# Patient Record
Sex: MALE | Race: WHITE | URBAN - METROPOLITAN AREA
[De-identification: names, ages, dates, MRNs, and addresses within clinical notes are randomized per-mention and may not be internally consistent; named-entity substitution may affect disease eponyms.]

---

## 2017-01-17 ENCOUNTER — OFFICE (OUTPATIENT)
Dept: URBAN - METROPOLITAN AREA CLINIC 45 | Facility: CLINIC | Age: 72
End: 2017-01-17

## 2017-01-17 VITALS
SYSTOLIC BLOOD PRESSURE: 136 MMHG | WEIGHT: 175 LBS | HEART RATE: 68 BPM | TEMPERATURE: 97.3 F | HEIGHT: 66 IN | DIASTOLIC BLOOD PRESSURE: 72 MMHG

## 2017-01-17 DIAGNOSIS — R19.4 ALTERED BOWEL FUNCTION: ICD-10-CM

## 2017-01-17 DIAGNOSIS — I10 HYPERTENSION: ICD-10-CM

## 2017-01-17 DIAGNOSIS — R10.11 RIGHT UPPER QUADRANT PAIN: ICD-10-CM

## 2017-01-17 DIAGNOSIS — B18.2 HEPATITIS C CHRONIC: ICD-10-CM

## 2017-01-17 PROCEDURE — 99214 OFFICE O/P EST MOD 30 MIN: CPT | Performed by: INTERNAL MEDICINE

## 2017-01-17 NOTE — SERVICEHPINOTES
The patient complains of abdominal pain. Symptoms began several months ago with onset that was gradual. It is localized as right upper quadrant pain. It is described as moderate in nature. Pain quality is described as dull. It also radiates to the sub-sternal region and epigastric region. Discomfort typically lasts a few hours and has a course which is intermittent. It usually starts gradually. Symptom triggers include nothing specific and eating. Alleviating factors include nothing specific and rest. Symptoms have been non-progressive/stable since onset. Alarm features noted: none. The patient also reports abdominal bloating/distension and constipation. Symptoms have caused the patient to see primary care physician. Noteworthy prior abdominal interventions include hernia repair. EGD and COLONOSCOPY 2011, Suchov has been performed previously to evaluate the patient's symptoms. Remedies tried to date include PPI with no benefit. Denies any blood in stool or black stool. No fever or chills. Pt has hx of Hep C under care of [facility] sp Rx, told of cure. Dr Mellissa Chambers.    Pt currently taking Pantop for RUQ pain and feels better. No heartburn but has reflux wo vomiting or dysphagia. Has not lost weight. The patient complains of a change in bowel habit/pattern.    Change in bowel function started   several  months   ago.   Currently the patient is having   3   bowel movement(s) per   day  .   Compared to baseline, the patient's stools have become   semi-formed and decreased in caliber   in consistency/appearance.  They are typically   brown   in color.      Associated symptoms have included   alternating bowel pattern  .    The patient has tried   PPI therapy   so far.  This   has not   resulted in noticeable improvement.    Alarm features reported:   none  .   The patient last underwent a colonoscopy   5   years ago.   Usually had 1 bm daily. Now may have 2-3 bms followed by constipation.

## 2017-02-13 ENCOUNTER — AMBULATORY SURGICAL CENTER (OUTPATIENT)
Dept: URBAN - METROPOLITAN AREA SURGERY 37 | Facility: SURGERY | Age: 72
End: 2017-02-13

## 2017-02-13 VITALS
DIASTOLIC BLOOD PRESSURE: 80 MMHG | WEIGHT: 175 LBS | OXYGEN SATURATION: 96 % | TEMPERATURE: 98 F | HEART RATE: 76 BPM | SYSTOLIC BLOOD PRESSURE: 153 MMHG | RESPIRATION RATE: 15 BRPM | HEIGHT: 66 IN

## 2017-02-13 DIAGNOSIS — R10.11 RIGHT UPPER QUADRANT PAIN: ICD-10-CM

## 2017-02-13 DIAGNOSIS — K31.89 OTHER DISEASES OF STOMACH AND DUODENUM: ICD-10-CM

## 2017-02-13 DIAGNOSIS — Z12.11 ENCOUNTER FOR SCREENING FOR MALIGNANT NEOPLASM OF COLON: ICD-10-CM

## 2017-02-13 DIAGNOSIS — R93.3 ABNORMAL FINDINGS ON DX IMAGING OF PRT DIGESTIVE TRACT: ICD-10-CM

## 2017-02-13 DIAGNOSIS — K64.0 FIRST DEGREE HEMORRHOIDS: ICD-10-CM

## 2017-02-13 LAB — SURGICAL: PDF REPORT: (no result)

## 2017-02-13 PROCEDURE — 43239 EGD BIOPSY SINGLE/MULTIPLE: CPT | Performed by: INTERNAL MEDICINE

## 2017-02-13 PROCEDURE — 45378 DIAGNOSTIC COLONOSCOPY: CPT | Performed by: INTERNAL MEDICINE

## 2017-02-13 NOTE — SERVICEHPINOTES
The patient complains of abdominal pain. Symptoms began several months ago with onset that was gradual. It is localized as right upper quadrant pain. It is described as moderate in nature. Pain quality is described as dull. It also radiates to the sub-sternal region and epigastric region. Discomfort typically lasts a few hours and has a course which is intermittent. It usually starts gradually. Symptom triggers include nothing specific and eating. Alleviating factors include nothing specific and rest. Symptoms have been non-progressive/stable since onset. Alarm features noted: none. The patient also reports abdominal bloating/distension and constipation. Symptoms have caused the patient to see primary care physician. Noteworthy prior abdominal interventions include hernia repair. EGD and COLONOSCOPY 2011, Suchov has been performed previously to evaluate the patient's symptoms. Remedies tried to date include PPI with no benefit. Denies any blood in stool or black stool. No fever or chills. Pt has hx of Hep C under care of [facility] sp Rx, told of cure. Dr Mellissa Chambers. Pt currently taking Pantop for RUQ pain and feels better. No heartburn but has reflux wo vomiting or dysphagia. Has not lost weight. The patient complains of a change in bowel habit/pattern. Change in bowel function started several months ago. Currently the patient is having 3 bowel movement(s) per day. Compared to baseline, the patient's stools have become semi-formed and decreased in caliber in consistency/appearance. They are typically brown in color. Associated symptoms have included alternating bowel pattern. The patient has tried PPI therapy so far. This has not resulted in noticeable improvement. Alarm features reported: none. The patient last underwent a colonoscopy 5 years ago. Usually had 1 bm daily. Now may have 2-3 bms followed by constipation.

## 2017-02-23 ENCOUNTER — OFFICE (OUTPATIENT)
Dept: URBAN - METROPOLITAN AREA CLINIC 45 | Facility: CLINIC | Age: 72
End: 2017-02-23

## 2017-02-23 VITALS
WEIGHT: 175 LBS | SYSTOLIC BLOOD PRESSURE: 128 MMHG | TEMPERATURE: 97.9 F | HEART RATE: 64 BPM | DIASTOLIC BLOOD PRESSURE: 67 MMHG | HEIGHT: 66 IN

## 2017-02-23 DIAGNOSIS — K29.70 GASTRITIS, UNSPECIFIED, WITHOUT BLEEDING: ICD-10-CM

## 2017-02-23 DIAGNOSIS — B18.2 HEPATITIS C CHRONIC: ICD-10-CM

## 2017-02-23 DIAGNOSIS — I10 HYPERTENSION: ICD-10-CM

## 2017-02-23 PROCEDURE — 99213 OFFICE O/P EST LOW 20 MIN: CPT | Performed by: INTERNAL MEDICINE

## 2017-02-23 NOTE — SERVICEHPINOTES
Follow-up of GERD was discussed.   The patient has typically complained of   heartburn/RUQ pain  .      Treatment has consisted of   OTC antacids and Pantoprazole  taken at   once daily  .   This therapy has been associated with   good   relief.   The patient   has not   been having breakthru GERD symptoms.  Symptoms do   not awaken the patient from sleep  .    Has been treating residual symptoms with   OTC antacids  .   Continuing symptoms may be brought on by   nothing specific  .    Recent egd gerd/gastritis. No n/v fever or chills. Colonoscopy attempt unsuccessful due to very poor prep. Still has very irreg bms with occ constipation alt with semi formed stool. No blood in stool or black stool.

## 2017-04-20 ENCOUNTER — OFFICE (OUTPATIENT)
Dept: URBAN - METROPOLITAN AREA CLINIC 45 | Facility: CLINIC | Age: 72
End: 2017-04-20

## 2017-04-20 VITALS
HEIGHT: 66 IN | HEART RATE: 67 BPM | WEIGHT: 173 LBS | TEMPERATURE: 98 F | SYSTOLIC BLOOD PRESSURE: 119 MMHG | DIASTOLIC BLOOD PRESSURE: 76 MMHG

## 2017-04-20 DIAGNOSIS — K59.00 CONSTIPATION: ICD-10-CM

## 2017-04-20 DIAGNOSIS — Z12.11 SCREENING FOR COLON CANCER: ICD-10-CM

## 2017-04-20 DIAGNOSIS — I10 HYPERTENSION: ICD-10-CM

## 2017-04-20 DIAGNOSIS — R10.11 RIGHT UPPER QUADRANT PAIN: ICD-10-CM

## 2017-04-20 DIAGNOSIS — K21.9 GERD: ICD-10-CM

## 2017-04-20 PROCEDURE — 99213 OFFICE O/P EST LOW 20 MIN: CPT | Performed by: INTERNAL MEDICINE

## 2017-04-20 NOTE — SERVICEHPINOTES
Follow-up of GERD was discussed.   The patient has typically complained of   heartburn and regurgitation/RUQ pain  .      Treatment has consisted of   Pantoprazole  taken at   once daily  .   This therapy has been associated with   partial   relief.   The patient   has   been having breakthru GERD symptoms.  Symptoms do   not awaken the patient from sleep  .    Has been treating residual symptoms with   OTC antacids  .   Continuing symptoms may be brought on by   nothing specific  .    No dysphagia or wt loss. Recent egd with mild gerd/gastritis. Continues to have intermittent dull RUQ pain relieved with bm. Bms are irreg with tendency towards constipation. Pt's last colonoscopy attempt was unsuccessful due to poor prep. Denies any blood in stool or black stool.

## 2017-05-01 ENCOUNTER — AMBULATORY SURGICAL CENTER (OUTPATIENT)
Dept: URBAN - METROPOLITAN AREA SURGERY 37 | Facility: SURGERY | Age: 72
End: 2017-05-01

## 2017-05-01 VITALS
DIASTOLIC BLOOD PRESSURE: 96 MMHG | RESPIRATION RATE: 20 BRPM | SYSTOLIC BLOOD PRESSURE: 154 MMHG | TEMPERATURE: 98 F | HEART RATE: 71 BPM | HEIGHT: 66 IN | OXYGEN SATURATION: 96 % | WEIGHT: 175 LBS

## 2017-05-01 DIAGNOSIS — K59.00 CONSTIPATION, UNSPECIFIED: ICD-10-CM

## 2017-05-01 DIAGNOSIS — K64.1 SECOND DEGREE HEMORRHOIDS: ICD-10-CM

## 2017-05-01 DIAGNOSIS — Z12.11 ENCOUNTER FOR SCREENING FOR MALIGNANT NEOPLASM OF COLON: ICD-10-CM

## 2017-05-01 DIAGNOSIS — D12.2 BENIGN NEOPLASM OF ASCENDING COLON: ICD-10-CM

## 2017-05-01 LAB — SURGICAL: PDF REPORT: (no result)

## 2017-05-01 PROCEDURE — 45380 COLONOSCOPY AND BIOPSY: CPT | Performed by: INTERNAL MEDICINE

## 2017-05-09 ENCOUNTER — OFFICE (OUTPATIENT)
Dept: URBAN - METROPOLITAN AREA CLINIC 45 | Facility: CLINIC | Age: 72
End: 2017-05-09

## 2017-05-09 VITALS
TEMPERATURE: 98 F | HEART RATE: 74 BPM | SYSTOLIC BLOOD PRESSURE: 116 MMHG | WEIGHT: 175 LBS | HEIGHT: 66 IN | DIASTOLIC BLOOD PRESSURE: 67 MMHG

## 2017-05-09 DIAGNOSIS — I10 HYPERTENSION: ICD-10-CM

## 2017-05-09 DIAGNOSIS — R10.11 RIGHT UPPER QUADRANT PAIN: ICD-10-CM

## 2017-05-09 PROCEDURE — 99213 OFFICE O/P EST LOW 20 MIN: CPT | Performed by: INTERNAL MEDICINE

## 2017-05-09 NOTE — SERVICEHPINOTES
Recent colonoscopy   pos for sessile serrated adenoma.  Regarding   right upper quadrant   abdominal pain, the most likely etiology considered thus far has been   functional abdominal pain  .   Since last visit the patient states the pain has   persisted  .    Medication classes tried so far include   PPI therapy  .      It is currently   mild   in severity.     It is   dull   in quality.    It also radiates to the   none  .     Abdominal pain triggers include   nothing specific and mental/emotional stress  .    Symptoms are alleviated by   nothing specific and defecation  .    The patient also reports   constipation  .   Ongoing alarm symptoms:   none  .    Abdominal pain has been severe enough to cause the patient to   see primary care physician  .    The workup has thus far included   complete abdominal ultrasound, EGD and colonoscopy  .

## 2017-05-10 ENCOUNTER — HOSPITAL ENCOUNTER (OUTPATIENT)
Dept: HOSPITAL 54 - LAB | Age: 72
Discharge: HOME | End: 2017-05-10
Payer: MEDICARE

## 2017-05-10 DIAGNOSIS — R10.11: ICD-10-CM

## 2017-05-10 DIAGNOSIS — I10: Primary | ICD-10-CM

## 2017-05-10 LAB
BUN SERPL-MCNC: 15 MG/DL (ref 7–18)
CREAT SERPL-MCNC: 1.1 MG/DL (ref 0.6–1.3)

## 2017-05-11 ENCOUNTER — HOSPITAL ENCOUNTER (OUTPATIENT)
Dept: HOSPITAL 54 - CT | Age: 72
Discharge: HOME | End: 2017-05-11
Payer: MEDICARE

## 2017-05-11 DIAGNOSIS — I10: Primary | ICD-10-CM

## 2017-05-11 DIAGNOSIS — N28.1: ICD-10-CM

## 2017-05-11 DIAGNOSIS — I70.0: ICD-10-CM

## 2017-05-11 DIAGNOSIS — I31.1: ICD-10-CM

## 2017-05-11 DIAGNOSIS — M47.819: ICD-10-CM

## 2017-05-11 PROCEDURE — 74160 CT ABDOMEN W/CONTRAST: CPT

## 2018-02-07 ENCOUNTER — HOSPITAL ENCOUNTER (INPATIENT)
Dept: HOSPITAL 54 - ER | Age: 73
LOS: 2 days | Discharge: ANOTHER HOSPITAL | DRG: 242 | End: 2018-02-09
Payer: MEDICARE

## 2018-02-07 VITALS — SYSTOLIC BLOOD PRESSURE: 96 MMHG | DIASTOLIC BLOOD PRESSURE: 54 MMHG

## 2018-02-07 VITALS — DIASTOLIC BLOOD PRESSURE: 54 MMHG | SYSTOLIC BLOOD PRESSURE: 94 MMHG

## 2018-02-07 VITALS — SYSTOLIC BLOOD PRESSURE: 122 MMHG | DIASTOLIC BLOOD PRESSURE: 74 MMHG

## 2018-02-07 VITALS — SYSTOLIC BLOOD PRESSURE: 97 MMHG | DIASTOLIC BLOOD PRESSURE: 57 MMHG

## 2018-02-07 VITALS — SYSTOLIC BLOOD PRESSURE: 145 MMHG | DIASTOLIC BLOOD PRESSURE: 68 MMHG

## 2018-02-07 VITALS — DIASTOLIC BLOOD PRESSURE: 72 MMHG | SYSTOLIC BLOOD PRESSURE: 122 MMHG

## 2018-02-07 VITALS — DIASTOLIC BLOOD PRESSURE: 56 MMHG | SYSTOLIC BLOOD PRESSURE: 97 MMHG

## 2018-02-07 VITALS — DIASTOLIC BLOOD PRESSURE: 66 MMHG | SYSTOLIC BLOOD PRESSURE: 116 MMHG

## 2018-02-07 VITALS — SYSTOLIC BLOOD PRESSURE: 96 MMHG | DIASTOLIC BLOOD PRESSURE: 55 MMHG

## 2018-02-07 VITALS — DIASTOLIC BLOOD PRESSURE: 62 MMHG | SYSTOLIC BLOOD PRESSURE: 122 MMHG

## 2018-02-07 VITALS — SYSTOLIC BLOOD PRESSURE: 94 MMHG | DIASTOLIC BLOOD PRESSURE: 57 MMHG

## 2018-02-07 VITALS — DIASTOLIC BLOOD PRESSURE: 60 MMHG | SYSTOLIC BLOOD PRESSURE: 102 MMHG

## 2018-02-07 VITALS — SYSTOLIC BLOOD PRESSURE: 93 MMHG | DIASTOLIC BLOOD PRESSURE: 53 MMHG

## 2018-02-07 VITALS — HEIGHT: 72 IN | WEIGHT: 186 LBS | BODY MASS INDEX: 25.19 KG/M2

## 2018-02-07 VITALS — DIASTOLIC BLOOD PRESSURE: 74 MMHG | SYSTOLIC BLOOD PRESSURE: 122 MMHG

## 2018-02-07 VITALS — SYSTOLIC BLOOD PRESSURE: 100 MMHG | DIASTOLIC BLOOD PRESSURE: 45 MMHG

## 2018-02-07 VITALS — SYSTOLIC BLOOD PRESSURE: 131 MMHG | DIASTOLIC BLOOD PRESSURE: 77 MMHG

## 2018-02-07 DIAGNOSIS — I25.10: ICD-10-CM

## 2018-02-07 DIAGNOSIS — Z86.19: ICD-10-CM
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
## 2018-02-07 DIAGNOSIS — I50.9: ICD-10-CM

## 2018-02-07 DIAGNOSIS — I49.5: Primary | ICD-10-CM

## 2018-02-07 DIAGNOSIS — E11.9: ICD-10-CM

## 2018-02-07 DIAGNOSIS — R56.9: ICD-10-CM

## 2018-02-07 DIAGNOSIS — N17.0: ICD-10-CM
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
## 2018-02-07 DIAGNOSIS — I11.0: ICD-10-CM

## 2018-02-07 DIAGNOSIS — I21.4: ICD-10-CM

## 2018-02-07 DIAGNOSIS — R55: ICD-10-CM

## 2018-02-07 DIAGNOSIS — D72.829: ICD-10-CM

## 2018-02-07 DIAGNOSIS — E66.9: ICD-10-CM

## 2018-02-07 DIAGNOSIS — I35.0: ICD-10-CM

## 2018-02-07 DIAGNOSIS — S42.291A: ICD-10-CM

## 2018-02-07 DIAGNOSIS — I46.9: ICD-10-CM

## 2018-02-07 LAB
ALBUMIN SERPL BCP-MCNC: 3.6 G/DL (ref 3.4–5)
ALBUMIN SERPL BCP-MCNC: 3.9 G/DL (ref 3.4–5)
ALP SERPL-CCNC: 81 U/L (ref 46–116)
ALP SERPL-CCNC: 90 U/L (ref 46–116)
ALT SERPL W P-5'-P-CCNC: 31 U/L (ref 12–78)
ALT SERPL W P-5'-P-CCNC: 34 U/L (ref 12–78)
AMMONIA PLAS-SCNC: 25 UMOL/L (ref 11–32)
APAP SERPL-MCNC: 0 UG/ML (ref 10–30)
APPEARANCE UR: CLEAR
APTT PPP: 24 SEC (ref 23–34)
AST SERPL W P-5'-P-CCNC: 35 U/L (ref 15–37)
AST SERPL W P-5'-P-CCNC: 59 U/L (ref 15–37)
BASOPHILS # BLD AUTO: 0 /CMM (ref 0–0.2)
BASOPHILS # BLD AUTO: 0 /CMM (ref 0–0.2)
BASOPHILS NFR BLD AUTO: 0.2 % (ref 0–2)
BASOPHILS NFR BLD AUTO: 0.2 % (ref 0–2)
BILIRUB DIRECT SERPL-MCNC: 0.1 MG/DL (ref 0–0.2)
BILIRUB SERPL-MCNC: 0.4 MG/DL (ref 0.2–1)
BILIRUB SERPL-MCNC: 0.9 MG/DL (ref 0.2–1)
BILIRUB UR QL STRIP: NEGATIVE
BUN SERPL-MCNC: 21 MG/DL (ref 7–18)
BUN SERPL-MCNC: 24 MG/DL (ref 7–18)
CALCIUM SERPL-MCNC: 8.4 MG/DL (ref 8.5–10.1)
CALCIUM SERPL-MCNC: 9.2 MG/DL (ref 8.5–10.1)
CHLORIDE SERPL-SCNC: 100 MMOL/L (ref 98–107)
CHLORIDE SERPL-SCNC: 102 MMOL/L (ref 98–107)
CHOLEST SERPL-MCNC: 114 MG/DL (ref ?–200)
CO2 SERPL-SCNC: 22 MMOL/L (ref 21–32)
CO2 SERPL-SCNC: 27 MMOL/L (ref 21–32)
COLOR UR: YELLOW
CREAT SERPL-MCNC: 1 MG/DL (ref 0.6–1.3)
CREAT SERPL-MCNC: 1.2 MG/DL (ref 0.6–1.3)
EOSINOPHIL # BLD AUTO: 0 /CMM (ref 0–0.7)
EOSINOPHIL # BLD AUTO: 0.1 /CMM (ref 0–0.7)
EOSINOPHIL NFR BLD AUTO: 0 % (ref 0–6)
EOSINOPHIL NFR BLD AUTO: 0.4 % (ref 0–6)
ETHANOL SERPL-MCNC: < 3 MG/DL (ref 0–0)
GLUCOSE SERPL-MCNC: 134 MG/DL (ref 74–106)
GLUCOSE SERPL-MCNC: 198 MG/DL (ref 74–106)
GLUCOSE UR STRIP-MCNC: (no result) MG/DL
HCT VFR BLD AUTO: 38 % (ref 39–51)
HCT VFR BLD AUTO: 41 % (ref 39–51)
HDLC SERPL-MCNC: 57 MG/DL (ref 40–60)
HGB BLD-MCNC: 13.1 G/DL (ref 13.5–17.5)
HGB BLD-MCNC: 14.1 G/DL (ref 13.5–17.5)
HGB UR QL STRIP: (no result) ERY/UL
INR PPP: 1.08 (ref 0.87–1.13)
KETONES UR STRIP-MCNC: NEGATIVE MG/DL
LDLC SERPL DIRECT ASSAY-MCNC: 56 MG/DL (ref 0–99)
LEUKOCYTE ESTERASE UR QL STRIP: NEGATIVE
LYMPHOCYTES NFR BLD AUTO: 0.3 /CMM (ref 0.8–4.8)
LYMPHOCYTES NFR BLD AUTO: 1.5 /CMM (ref 0.8–4.8)
LYMPHOCYTES NFR BLD AUTO: 2.5 % (ref 20–44)
LYMPHOCYTES NFR BLD AUTO: 9.2 % (ref 20–44)
MAGNESIUM SERPL-MCNC: 2.2 MG/DL (ref 1.8–2.4)
MCH RBC QN AUTO: 31 PG (ref 26–33)
MCH RBC QN AUTO: 31 PG (ref 26–33)
MCHC RBC AUTO-ENTMCNC: 34 G/DL (ref 31–36)
MCHC RBC AUTO-ENTMCNC: 35 G/DL (ref 31–36)
MCV RBC AUTO: 88 FL (ref 80–96)
MCV RBC AUTO: 89 FL (ref 80–96)
MONOCYTES NFR BLD AUTO: 0.6 /CMM (ref 0.1–1.3)
MONOCYTES NFR BLD AUTO: 0.8 /CMM (ref 0.1–1.3)
MONOCYTES NFR BLD AUTO: 4.5 % (ref 2–12)
MONOCYTES NFR BLD AUTO: 5.1 % (ref 2–12)
NEUTROPHILS # BLD AUTO: 13.2 /CMM (ref 1.8–8.9)
NEUTROPHILS # BLD AUTO: 13.8 /CMM (ref 1.8–8.9)
NEUTROPHILS NFR BLD AUTO: 85.1 % (ref 43–81)
NEUTROPHILS NFR BLD AUTO: 92.8 % (ref 43–81)
NITRITE UR QL STRIP: NEGATIVE
NT-PROBNP SERPL-MCNC: 141 PG/ML (ref 0–125)
PH UR STRIP: 6 [PH] (ref 5–8)
PHOSPHATE SERPL-MCNC: 2.2 MG/DL (ref 2.5–4.9)
PLATELET # BLD AUTO: 169 /CMM (ref 150–450)
PLATELET # BLD AUTO: 212 /CMM (ref 150–450)
POTASSIUM SERPL-SCNC: 3.7 MMOL/L (ref 3.5–5.1)
POTASSIUM SERPL-SCNC: 3.7 MMOL/L (ref 3.5–5.1)
PROT SERPL-MCNC: 6.7 G/DL (ref 6.4–8.2)
PROT SERPL-MCNC: 7.5 G/DL (ref 6.4–8.2)
PROT UR QL STRIP: NEGATIVE MG/DL
RBC # BLD AUTO: 4.27 MIL/UL (ref 4.5–6)
RBC # BLD AUTO: 4.61 MIL/UL (ref 4.5–6)
RBC #/AREA URNS HPF: (no result) /HPF (ref 0–2)
RDW COEFFICIENT OF VARIATION: 13.4 (ref 11.5–15)
RDW COEFFICIENT OF VARIATION: 13.7 (ref 11.5–15)
SALICYLATES SERPL-MCNC: 1 MG/DL (ref 2.8–20)
SODIUM SERPL-SCNC: 136 MMOL/L (ref 136–145)
SODIUM SERPL-SCNC: 136 MMOL/L (ref 136–145)
TRIGL SERPL-MCNC: 24 MG/DL (ref 30–150)
TROPONIN I SERPL-MCNC: 3.65 NG/ML (ref 0–0.06)
TROPONIN I SERPL-MCNC: < 0.017 NG/ML (ref 0–0.06)
TSH SERPL DL<=0.005 MIU/L-ACNC: 1.23 UIU/ML (ref 0.36–3.74)
UROBILINOGEN UR STRIP-MCNC: 0.2 EU/DL
WBC #/AREA URNS HPF: (no result) /HPF (ref 0–3)
WBC NRBC COR # BLD AUTO: 14.3 K/UL (ref 4.3–11)
WBC NRBC COR # BLD AUTO: 16.3 K/UL (ref 4.3–11)

## 2018-02-07 PROCEDURE — 02H63JZ INSERTION OF PACEMAKER LEAD INTO RIGHT ATRIUM, PERCUTANEOUS APPROACH: ICD-10-PCS

## 2018-02-07 PROCEDURE — A4606 OXYGEN PROBE USED W OXIMETER: HCPCS

## 2018-02-07 PROCEDURE — 02HK3JZ INSERTION OF PACEMAKER LEAD INTO RIGHT VENTRICLE, PERCUTANEOUS APPROACH: ICD-10-PCS

## 2018-02-07 PROCEDURE — 0JH606Z INSERTION OF PACEMAKER, DUAL CHAMBER INTO CHEST SUBCUTANEOUS TISSUE AND FASCIA, OPEN APPROACH: ICD-10-PCS

## 2018-02-07 PROCEDURE — G0480 DRUG TEST DEF 1-7 CLASSES: HCPCS

## 2018-02-07 PROCEDURE — A4216 STERILE WATER/SALINE, 10 ML: HCPCS

## 2018-02-07 PROCEDURE — Z7610: HCPCS

## 2018-02-07 PROCEDURE — A9579 GAD-BASE MR CONTRAST NOS,1ML: HCPCS

## 2018-02-07 RX ADMIN — SODIUM CHLORIDE, SODIUM LACTATE, POTASSIUM CHLORIDE, AND CALCIUM CHLORIDE PRN MLS/HR: .6; .31; .03; .02 INJECTION, SOLUTION INTRAVENOUS at 11:14

## 2018-02-07 RX ADMIN — FAMOTIDINE SCH MG: 10 INJECTION INTRAVENOUS at 22:28

## 2018-02-07 RX ADMIN — LORAZEPAM ONE MG: 2 INJECTION INTRAMUSCULAR; INTRAVENOUS at 02:35

## 2018-02-07 RX ADMIN — Medication PRN MG: at 19:33

## 2018-02-07 RX ADMIN — LORAZEPAM ONE MG: 2 INJECTION INTRAMUSCULAR; INTRAVENOUS at 01:00

## 2018-02-07 RX ADMIN — SODIUM CHLORIDE SCH MLS/HR: 9 INJECTION, SOLUTION INTRAVENOUS at 12:19

## 2018-02-07 RX ADMIN — FAMOTIDINE SCH MG: 10 INJECTION INTRAVENOUS at 11:13

## 2018-02-07 RX ADMIN — SODIUM CHLORIDE SCH MLS/HR: 9 INJECTION, SOLUTION INTRAVENOUS at 04:02

## 2018-02-07 RX ADMIN — SODIUM CHLORIDE SCH MLS/HR: 9 INJECTION, SOLUTION INTRAVENOUS at 04:01

## 2018-02-07 RX ADMIN — DEXTROSE MONOHYDRATE SCH MLS/HR: 50 INJECTION, SOLUTION INTRAVENOUS at 18:16

## 2018-02-07 RX ADMIN — ASPIRIN 81 MG SCH MG: 81 TABLET ORAL at 15:04

## 2018-02-07 RX ADMIN — ENOXAPARIN SODIUM SCH EA: 100 INJECTION SUBCUTANEOUS at 17:00

## 2018-02-07 RX ADMIN — ENOXAPARIN SODIUM SCH MG: 80 INJECTION SUBCUTANEOUS at 22:30

## 2018-02-07 RX ADMIN — SODIUM CHLORIDE SCH MLS/HR: 9 INJECTION, SOLUTION INTRAVENOUS at 04:08

## 2018-02-07 NOTE — NUR
RN INITIAL NOTES



RECEIVED PATIENT AS TRANSFER FROM ICU VIA ACLS PROTOCOL, PATIENT TOLERATED TRANSFER WELL. 
PATIENT IS LETHARGIC, BUT AROUSABLE TO NAME, ALERT AND ORIENTED X3. BREATHING EVEN AND 
NONLABORED WHILE ON O2 VIA NC @ 2LPM, TOLERATING WELL, FREE FROM ANY S/S OF RESPIRATORY 
DISTRESS. PLACED ON TELEMETRY MONITORING, REVEALING SINUS RHYTHM WITH BBB, HR = 72 AT THIS 
TIME, NO PACER SPIKES NOTED AT THIS TIME. RIGHT ARM SLING IN PLACE. IV SITES PATENT AND 
INTACT, FLUSHED WITH NS, FREE FROM ANY S/S OF INFILTRATION OR PHLEBITIS, IVF INFUSING AS 
ORDERED. DVT PUMPS IN PLACE. CALL LIGHT LEFT WITHIN EASY REACH, BED IN LOWEST AND LOCKED 
POSITION. WILL CONTINUE TO CLOSELY MONITOR

## 2018-02-07 NOTE — NUR
RN NOTES



PT COMPLAINED OF SEVERE PAIN  BETWEEN 8-10 SCALE. PT IS AOX3 STILL SLEEPY BUT VERBALIZED 
FEELINGS OF PAIN, FAMILY AT BEDSIDE.  PRN MORPHINE IVP ADMINISTERED AS ORDERED.  AND WILL 
FOLLOW UP  FOR EFFECTIVENESS AFTER 30 -1 H. FAMILY AT BEDSIDE (DAUGHTER AND WIFE) MADE 
AWARE.

## 2018-02-07 NOTE — NUR
RN NOTE

OBTAINED CONSENT FOR PACEMAKER PLACEMENT FROM WIFE AWA FARIA AFTER SHE SPOKE WITH DR MCCLELLAN. 
DAUGHTER AND SON IN LAW AT BEDSIDE. UPDATE REGARDING THE PT GIVEN.

## 2018-02-07 NOTE — NUR
ICU/RN

RECEIVED DIRECT FROM ER VIA [facility] TO  W/ ADMITTING DX OF SEIZURE.MONITOR SHOWS NSR 
W/OUT ECTOPICS.PT TRASHING IN BED, EXTREMELY AGITATED.W/ PROJECTILE VOMITING LARGE AMT OF 
UNDIGESTED FOOD,GIBBERISH,UNCOOPERATIVE.BATHED AND LINEN CHANGED,QUIETED DOWN AFTERWARDS.

## 2018-02-07 NOTE — NUR
RN NOTE

 PER DR FALCON DOWNGRADE PT TO TELE, PT NEEDS TO BE NPO EXCEPT MEDS POST MIDNIGHT FOR [facility] CARDIAC CATH LAB,  TIME AT 0900 ON 02/08/18, HOLD LOVENOX IN THE 
MORNING, GIVE REPORT TO CARDIAC CATH AT [phone]. WILL ENDORSE TO NEXT SHIFT

## 2018-02-07 NOTE — NUR
ICU/RN 

WIFE HERE TO SEE PT.INTERVIEWED WIFE RE:PT'S CONDITION AND QUESTIONNAIRE.CONDITION REPORT 
GIVEN.PT SLEEPING AT THIS TIME. MOVES ALL EXTREMITIES.

## 2018-02-07 NOTE — NUR
74 Y/O MALE PLACED IN BED 6 C/O LEFT SHOULDER PAIN. OXYGEN SAT LOW - 88%. 
PLACED ON 3L OXYGEN. PT TAKENTO CT.

## 2018-02-07 NOTE — NUR
RN NOTE

PT ABLE TO TOLERATE LIQUIDS AND MEDS. SWALLOWED NORMALLY EARLIER, PER DR IT IS OKAY TO ORDER 
CARDIAC DIET.

## 2018-02-07 NOTE — NUR
RN NOTES



FAMILY LEFT AND LEAVE THEIR CELL NUMBER, AWARE ABOUT THE PLAN OF CARE FOR TOMORROW TO [facility] FOR CARDIAC CATH.

## 2018-02-07 NOTE — NUR
PT GIVEN ATIVAN POST SZ. THE COMBINATION OF POST-ICTAL AND ATIVAN HAS MADE THE 
PT VERY CONFUSED. PT IS VERY RESTLES, MOVING HIS LEGS FROM SIDE TO SIDE AND 
MAKING ANIMAL SOUNDS. PT HAS BEEN ASSIGNED A ROOM - 256. REPORT IS BEING CALLED 
AND PT IS BEING PREPED FOR TRANSFER TO ICU.

## 2018-02-07 NOTE — NUR
RN NOTE

SPOKE WITH DR FALCON REGARDING TROPONIN 7.238, HE ORDERED ASPIRIN DAILY 81MG AND LOVENOX 
1MG/KG BID. WILL ORDER AND VERIFY WITH PHARMACY.

## 2018-02-07 NOTE — NUR
RN NOTE

PT SLEEPY, BUT WAKES UP, PT AWARE WHERE HE IS. NO CO PAIN. IV SITE IN R AC, VS STABLE. CALL 
LIGHT WITHIN REACH. SAFETY MEASURES AND SEIZURE PRECAUTIONS OBSERVED. WILL MONITOR

## 2018-02-07 NOTE — NUR
RN NOTE

PT CAME BACK FROM OR L CHEST WALL INCISION REDNESS, NO SWELLING, NO BLEEDING, VS STABLE, 
PAIN IN LEFT ARM  UPON MOVING, AS WELL AS ON RIGHT ARM DUE TO FRACTURE. CALL LIGHT WITHIN 
REACH, WILL MONITOR PT CLOSELY.

## 2018-02-08 VITALS — DIASTOLIC BLOOD PRESSURE: 56 MMHG | SYSTOLIC BLOOD PRESSURE: 139 MMHG

## 2018-02-08 VITALS — SYSTOLIC BLOOD PRESSURE: 116 MMHG | DIASTOLIC BLOOD PRESSURE: 71 MMHG

## 2018-02-08 VITALS — SYSTOLIC BLOOD PRESSURE: 129 MMHG | DIASTOLIC BLOOD PRESSURE: 62 MMHG

## 2018-02-08 VITALS — DIASTOLIC BLOOD PRESSURE: 61 MMHG | SYSTOLIC BLOOD PRESSURE: 143 MMHG

## 2018-02-08 VITALS — SYSTOLIC BLOOD PRESSURE: 129 MMHG | DIASTOLIC BLOOD PRESSURE: 71 MMHG

## 2018-02-08 VITALS — DIASTOLIC BLOOD PRESSURE: 66 MMHG | SYSTOLIC BLOOD PRESSURE: 135 MMHG

## 2018-02-08 LAB
ALBUMIN SERPL BCP-MCNC: 3.1 G/DL (ref 3.4–5)
ALP SERPL-CCNC: 68 U/L (ref 46–116)
ALT SERPL W P-5'-P-CCNC: 35 U/L (ref 12–78)
AST SERPL W P-5'-P-CCNC: 66 U/L (ref 15–37)
BASOPHILS # BLD AUTO: 0 /CMM (ref 0–0.2)
BASOPHILS NFR BLD AUTO: 0.4 % (ref 0–2)
BILIRUB SERPL-MCNC: 0.7 MG/DL (ref 0.2–1)
BUN SERPL-MCNC: 16 MG/DL (ref 7–18)
CALCIUM SERPL-MCNC: 8.5 MG/DL (ref 8.5–10.1)
CHLORIDE SERPL-SCNC: 105 MMOL/L (ref 98–107)
CO2 SERPL-SCNC: 22 MMOL/L (ref 21–32)
CREAT SERPL-MCNC: 0.9 MG/DL (ref 0.6–1.3)
EOSINOPHIL # BLD AUTO: 0 /CMM (ref 0–0.7)
EOSINOPHIL NFR BLD AUTO: 0.5 % (ref 0–6)
GLUCOSE SERPL-MCNC: 130 MG/DL (ref 74–106)
HCT VFR BLD AUTO: 35 % (ref 39–51)
HGB BLD-MCNC: 12.4 G/DL (ref 13.5–17.5)
LYMPHOCYTES NFR BLD AUTO: 0.5 /CMM (ref 0.8–4.8)
LYMPHOCYTES NFR BLD AUTO: 5.9 % (ref 20–44)
MAGNESIUM SERPL-MCNC: 2.3 MG/DL (ref 1.8–2.4)
MCH RBC QN AUTO: 31 PG (ref 26–33)
MCHC RBC AUTO-ENTMCNC: 35 G/DL (ref 31–36)
MCV RBC AUTO: 87 FL (ref 80–96)
MONOCYTES NFR BLD AUTO: 0.5 /CMM (ref 0.1–1.3)
MONOCYTES NFR BLD AUTO: 7 % (ref 2–12)
NEUTROPHILS # BLD AUTO: 6.8 /CMM (ref 1.8–8.9)
NEUTROPHILS NFR BLD AUTO: 86.2 % (ref 43–81)
PLATELET # BLD AUTO: 137 /CMM (ref 150–450)
POTASSIUM SERPL-SCNC: 3.9 MMOL/L (ref 3.5–5.1)
PROT SERPL-MCNC: 6.2 G/DL (ref 6.4–8.2)
RBC # BLD AUTO: 4.02 MIL/UL (ref 4.5–6)
RDW COEFFICIENT OF VARIATION: 12.4 (ref 11.5–15)
SODIUM SERPL-SCNC: 137 MMOL/L (ref 136–145)
WBC NRBC COR # BLD AUTO: 7.8 K/UL (ref 4.3–11)

## 2018-02-08 RX ADMIN — FAMOTIDINE SCH MG: 10 INJECTION INTRAVENOUS at 21:59

## 2018-02-08 RX ADMIN — SODIUM CHLORIDE SCH MLS/HR: 9 INJECTION, SOLUTION INTRAVENOUS at 01:34

## 2018-02-08 RX ADMIN — ENOXAPARIN SODIUM SCH EA: 100 INJECTION SUBCUTANEOUS at 09:00

## 2018-02-08 RX ADMIN — FAMOTIDINE SCH MG: 10 INJECTION INTRAVENOUS at 09:35

## 2018-02-08 RX ADMIN — ASPIRIN 81 MG SCH MG: 81 TABLET ORAL at 09:35

## 2018-02-08 RX ADMIN — DEXTROSE MONOHYDRATE SCH MLS/HR: 50 INJECTION, SOLUTION INTRAVENOUS at 02:23

## 2018-02-08 RX ADMIN — ENOXAPARIN SODIUM SCH EA: 100 INJECTION SUBCUTANEOUS at 15:09

## 2018-02-08 RX ADMIN — Medication PRN MG: at 22:01

## 2018-02-08 RX ADMIN — ENOXAPARIN SODIUM SCH MG: 80 INJECTION SUBCUTANEOUS at 09:00

## 2018-02-08 RX ADMIN — SODIUM CHLORIDE, SODIUM LACTATE, POTASSIUM CHLORIDE, AND CALCIUM CHLORIDE PRN MLS/HR: .6; .31; .03; .02 INJECTION, SOLUTION INTRAVENOUS at 10:30

## 2018-02-08 NOTE — NUR
TELE RN NOTES



CALLED [facility], THEY CANCEL THE CARDIAC CATH DUE TO NO BED AVAILABLE PER AKIL 
OF [facility].

## 2018-02-08 NOTE — NUR
TELE RN NOTES



RECEIVED PT ON BED SLEEPING, ALERT ORIENTED X3. ON NC 2L, SATURATING WELL NO SIGN OF 
RESPIRATORY DISTRESS. NO TELE MONITOR SR WITH BBB HR 70S. IV ACCESS LAC AND RAC. IV FLUID 
#20G D51/2NS WITH KCI 20MEQ @60CC/HR RUNNING WELL.  HEAD OF BED ELEVATED. SIDE RAILS UP. 
CALL LIGHT WITHIN REACH. WILL CONTINUE TO MONITOR PT CLOSELY.

## 2018-02-08 NOTE — NUR
RN NOTES



PATIENT WITH C/O RIGHT SHOULDER PAIN. PATIENT REPOSITIONED SLIGHTLY FOR COMFORT, WITH NOTED 
IMPROVEMENT IN PAIN. OFFERED PAIN MEDICATION, BUT PATIENT REFUSES. WILL CONTINUE TO CLOSELY 
MONITOR

## 2018-02-08 NOTE — NUR
RN OPENING NOTES

PT RESTING IN BED. FAMILY AT BEDSIDE. PT HAS RIGHT SHOULDER FX. PT IS TELE MONITORED AT SR 
WITH BBB RATE IN THE 80'S. S/P PACEMAKER PLACEMENT (2/7/18). PT WILL BE TRANSFERRED TO 
[facility] FOR CARDIAC CATH  WILL BE AT 0830. CONFIRMED WITH TRANSPORT. PT WILL BE 
NPO AFTER MIDNIGHT. PT HAS A R AC #16 INTACT AND PATENT AND A L AC #20 RUNNING D5 1/2 NS 
WITH KCL 20MEQ @60ML/HR. PT TOLERATING FLUIDS WELL. SAFETY PRECAUTIONS IN PLACE. WILL 
CONTINUE TO MONITOR.

## 2018-02-08 NOTE — NUR
RN CLOSING NOTES



PATIENT RESTING COMFORTABLY IN BED, DENIES PAIN. PATIENT NPO EXCEPT MEDS SINCE [place]. PATIENT 
ENDORSED TO THE AM SHIFT NURSE FOR MICKY, AWARE TO HOLD LOVENOX DOSE, SCHEDULED FOR CARDIAC 
CATH AT [facility],  AT 0900 PER CM.

## 2018-02-08 NOTE — NUR
TELE RN NOTES



NO ACUTE CHANGES NOTED DURING THE SHIFT. PROVIDED COMFORT AND SAFETY. DUE MEDS GIVEN. WILL 
ENDORSED TO THE PM NURSE FOR MICKY.

## 2018-02-08 NOTE — NUR
RN NOTES

PT AND FAMILY REFUSED XR OF SPINE. FAMILY STATED THAT THE PT WAS IN TOO MUCH PAIN TO GO 
THROUGH XRAY. GAVE FAMILY OPTION OF MEDICATING PT BEFORE XRAY. FAMILY CONTINUED TO REFUSE.

## 2018-02-08 NOTE — NUR
RN NOTES

PT REQUESTED PAIN PRN PAIN MEDICATION FOR RIGHT SHOULDER PAIN. WILL ADMINISTER PRN MORPHINE 
2MG. WILL CONTINUE TO MONITOR.

## 2018-02-09 ENCOUNTER — HOSPITAL ENCOUNTER (INPATIENT)
Dept: HOSPITAL 91 - CCL | Age: 73
LOS: 7 days | Discharge: HOME HEALTH | DRG: 247 | End: 2018-02-16
Payer: MEDICARE

## 2018-02-09 ENCOUNTER — HOSPITAL ENCOUNTER (INPATIENT)
Age: 73
LOS: 7 days | Discharge: HOME HEALTH | DRG: 247 | End: 2018-02-16

## 2018-02-09 VITALS — DIASTOLIC BLOOD PRESSURE: 63 MMHG | SYSTOLIC BLOOD PRESSURE: 138 MMHG

## 2018-02-09 VITALS — SYSTOLIC BLOOD PRESSURE: 130 MMHG | DIASTOLIC BLOOD PRESSURE: 63 MMHG

## 2018-02-09 VITALS — DIASTOLIC BLOOD PRESSURE: 62 MMHG | SYSTOLIC BLOOD PRESSURE: 130 MMHG

## 2018-02-09 DIAGNOSIS — S43.084A: ICD-10-CM

## 2018-02-09 DIAGNOSIS — K59.00: ICD-10-CM

## 2018-02-09 DIAGNOSIS — I35.0: ICD-10-CM

## 2018-02-09 DIAGNOSIS — R56.9: ICD-10-CM

## 2018-02-09 DIAGNOSIS — S42.91XA: ICD-10-CM

## 2018-02-09 DIAGNOSIS — Z95.0: ICD-10-CM

## 2018-02-09 DIAGNOSIS — Z86.74: ICD-10-CM

## 2018-02-09 DIAGNOSIS — Z86.19: ICD-10-CM

## 2018-02-09 DIAGNOSIS — K21.9: ICD-10-CM

## 2018-02-09 DIAGNOSIS — I49.8: ICD-10-CM

## 2018-02-09 DIAGNOSIS — I21.4: Primary | ICD-10-CM

## 2018-02-09 DIAGNOSIS — I49.5: ICD-10-CM

## 2018-02-09 DIAGNOSIS — R53.81: ICD-10-CM

## 2018-02-09 DIAGNOSIS — M54.9: ICD-10-CM

## 2018-02-09 DIAGNOSIS — I10: ICD-10-CM
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
## 2018-02-09 LAB
ADD MAN DIFF?: NO
ALANINE AMINOTRANSFERASE: 43 IU/L (ref 13–69)
ALBUMIN SERPL BCP-MCNC: 2.8 G/DL (ref 3.4–5)
ALBUMIN/GLOBULIN RATIO: 1.25
ALBUMIN: 3.5 G/DL (ref 3.3–4.9)
ALKALINE PHOSPHATASE: 59 IU/L (ref 42–121)
ALP SERPL-CCNC: 59 U/L (ref 46–116)
ALT SERPL W P-5'-P-CCNC: 32 U/L (ref 12–78)
ANION GAP: 6 (ref 8–16)
ASPARTATE AMINO TRANSFERASE: 43 IU/L (ref 15–46)
AST SERPL W P-5'-P-CCNC: 41 U/L (ref 15–37)
BASOPHIL #: 0 10^3/UL (ref 0–0.1)
BASOPHILS # BLD AUTO: 0 /CMM (ref 0–0.2)
BASOPHILS %: 0.4 % (ref 0–2)
BASOPHILS NFR BLD AUTO: 0.3 % (ref 0–2)
BILIRUB SERPL-MCNC: 0.6 MG/DL (ref 0.2–1)
BILIRUBIN,DIRECT: 0 MG/DL (ref 0–0.2)
BILIRUBIN,TOTAL: 0.7 MG/DL (ref 0.2–1.3)
BLOOD UREA NITROGEN: 14 MG/DL (ref 7–20)
BUN SERPL-MCNC: 18 MG/DL (ref 7–18)
CALCIUM SERPL-MCNC: 8.4 MG/DL (ref 8.5–10.1)
CALCIUM: 8.6 MG/DL (ref 8.4–10.2)
CARBON DIOXIDE: 28 MMOL/L (ref 21–31)
CHLORIDE SERPL-SCNC: 105 MMOL/L (ref 98–107)
CHLORIDE: 107 MMOL/L (ref 97–110)
CO2 SERPL-SCNC: 25 MMOL/L (ref 21–32)
CREAT SERPL-MCNC: 0.8 MG/DL (ref 0.6–1.3)
CREATININE: 0.74 MG/DL (ref 0.61–1.24)
EOSINOPHIL # BLD AUTO: 0.1 /CMM (ref 0–0.7)
EOSINOPHIL NFR BLD AUTO: 1.8 % (ref 0–6)
EOSINOPHILS #: 0.3 10^3/UL (ref 0–0.5)
EOSINOPHILS %: 3.5 % (ref 0–7)
GLOBULIN: 2.8 G/DL (ref 1.3–3.2)
GLUCOSE SERPL-MCNC: 134 MG/DL (ref 74–106)
GLUCOSE: 120 MG/DL (ref 70–220)
HCT VFR BLD AUTO: 32 % (ref 39–51)
HEMATOCRIT: 34.3 % (ref 42–52)
HEMOGLOBIN: 11.8 G/DL (ref 14–18)
HGB BLD-MCNC: 11.3 G/DL (ref 13.5–17.5)
LYMPHOCYTES #: 1 10^3/UL (ref 0.8–2.9)
LYMPHOCYTES %: 12.9 % (ref 15–51)
LYMPHOCYTES NFR BLD AUTO: 0.9 /CMM (ref 0.8–4.8)
LYMPHOCYTES NFR BLD AUTO: 12.2 % (ref 20–44)
MAGNESIUM SERPL-MCNC: 2.3 MG/DL (ref 1.8–2.4)
MCH RBC QN AUTO: 31 PG (ref 26–33)
MCHC RBC AUTO-ENTMCNC: 35 G/DL (ref 31–36)
MCV RBC AUTO: 89 FL (ref 80–96)
MEAN CORPUSCULAR HEMOGLOBIN: 30.6 PG (ref 29–33)
MEAN CORPUSCULAR HGB CONC: 34.4 G/DL (ref 32–37)
MEAN CORPUSCULAR VOLUME: 89.1 FL (ref 82–101)
MEAN PLATELET VOLUME: 11.2 FL (ref 7.4–10.4)
MONOCYTE #: 0.9 10^3/UL (ref 0.3–0.9)
MONOCYTES %: 11.7 % (ref 0–11)
MONOCYTES NFR BLD AUTO: 0.8 /CMM (ref 0.1–1.3)
MONOCYTES NFR BLD AUTO: 11.2 % (ref 2–12)
NEUTROPHIL #: 5.2 10^3/UL (ref 1.6–7.5)
NEUTROPHILS # BLD AUTO: 5.6 /CMM (ref 1.8–8.9)
NEUTROPHILS %: 70.8 % (ref 39–77)
NEUTROPHILS NFR BLD AUTO: 74.5 % (ref 43–81)
NUCLEATED RED BLOOD CELLS #: 0 10^3/UL (ref 0–0)
NUCLEATED RED BLOOD CELLS%: 0 /100WBC (ref 0–0)
PHOSPHATE SERPL-MCNC: 2.3 MG/DL (ref 2.5–4.9)
PLATELET # BLD AUTO: 134 /CMM (ref 150–450)
PLATELET COUNT: 155 10^3/UL (ref 140–415)
POTASSIUM SERPL-SCNC: 4.2 MMOL/L (ref 3.5–5.1)
POTASSIUM: 4 MMOL/L (ref 3.5–5.1)
PROT SERPL-MCNC: 6.2 G/DL (ref 6.4–8.2)
RBC # BLD AUTO: 3.65 MIL/UL (ref 4.5–6)
RDW COEFFICIENT OF VARIATION: 13.6 (ref 11.5–15)
RED BLOOD COUNT: 3.85 10^6/UL (ref 4.7–6.1)
RED CELL DISTRIBUTION WIDTH: 13 % (ref 11.5–14.5)
SODIUM SERPL-SCNC: 138 MMOL/L (ref 136–145)
SODIUM: 137 MMOL/L (ref 135–144)
TOTAL PROTEIN: 6.3 G/DL (ref 6.1–8.1)
TROPONIN I SERPL-MCNC: 0.95 NG/ML (ref 0–0.06)
WBC NRBC COR # BLD AUTO: 7.4 K/UL (ref 4.3–11)
WHITE BLOOD COUNT: 7.4 10^3/UL (ref 4.8–10.8)

## 2018-02-09 PROCEDURE — 72100 X-RAY EXAM L-S SPINE 2/3 VWS: CPT

## 2018-02-09 PROCEDURE — 73030 X-RAY EXAM OF SHOULDER: CPT

## 2018-02-09 PROCEDURE — 84100 ASSAY OF PHOSPHORUS: CPT

## 2018-02-09 PROCEDURE — 87081 CULTURE SCREEN ONLY: CPT

## 2018-02-09 PROCEDURE — 85025 COMPLETE CBC W/AUTO DIFF WBC: CPT

## 2018-02-09 PROCEDURE — 80053 COMPREHEN METABOLIC PANEL: CPT

## 2018-02-09 PROCEDURE — 97116 GAIT TRAINING THERAPY: CPT

## 2018-02-09 PROCEDURE — B211YZZ FLUOROSCOPY OF MULTIPLE CORONARY ARTERIES USING OTHER CONTRAST: ICD-10-PCS

## 2018-02-09 PROCEDURE — 97530 THERAPEUTIC ACTIVITIES: CPT

## 2018-02-09 PROCEDURE — 027034Z DILATION OF CORONARY ARTERY, ONE ARTERY WITH DRUG-ELUTING INTRALUMINAL DEVICE, PERCUTANEOUS APPROACH: ICD-10-PCS

## 2018-02-09 PROCEDURE — 95819 EEG AWAKE AND ASLEEP: CPT

## 2018-02-09 PROCEDURE — 97110 THERAPEUTIC EXERCISES: CPT

## 2018-02-09 PROCEDURE — 4A023N7 MEASUREMENT OF CARDIAC SAMPLING AND PRESSURE, LEFT HEART, PERCUTANEOUS APPROACH: ICD-10-PCS

## 2018-02-09 PROCEDURE — 93458 L HRT ARTERY/VENTRICLE ANGIO: CPT

## 2018-02-09 PROCEDURE — 73200 CT UPPER EXTREMITY W/O DYE: CPT

## 2018-02-09 PROCEDURE — 83735 ASSAY OF MAGNESIUM: CPT

## 2018-02-09 PROCEDURE — 80048 BASIC METABOLIC PNL TOTAL CA: CPT

## 2018-02-09 PROCEDURE — 97162 PT EVAL MOD COMPLEX 30 MIN: CPT

## 2018-02-09 RX ADMIN — Medication PRN MG: at 02:32

## 2018-02-09 RX ADMIN — LEVETIRACETAM 1 MG: 500 TABLET ORAL at 21:15

## 2018-02-09 RX ADMIN — THIAMINE HYDROCHLORIDE 1 MLS/HR: 100 INJECTION, SOLUTION INTRAMUSCULAR; INTRAVENOUS at 16:03

## 2018-02-09 RX ADMIN — ATORVASTATIN CALCIUM 1 MG: 20 TABLET, FILM COATED ORAL at 21:15

## 2018-02-09 RX ADMIN — SODIUM CHLORIDE, SODIUM LACTATE, POTASSIUM CHLORIDE, AND CALCIUM CHLORIDE PRN MLS/HR: .6; .31; .03; .02 INJECTION, SOLUTION INTRAVENOUS at 04:20

## 2018-02-09 RX ADMIN — METOPROLOL TARTRATE 1 MG: 25 TABLET ORAL at 21:15

## 2018-02-09 RX ADMIN — ZOLPIDEM TARTRATE 1 MG: 5 TABLET, FILM COATED ORAL at 21:15

## 2018-02-09 NOTE — NUR
RN CLOSING NOTES

PT AWAKE AND RESTING IN BED. NO COMPLAINTS OF PAIN, SOB OR DISTRESS AT THIS TIME. ALL PT 
NEEDS MET. PT IS TELE MONITORED SINUS RHYTHM WITH BBB HR IN THE 80'S. PT HAS A PACING 
PACEMAKER. PT IS NPO. PT HAS RIGHT AC #16 AND A LEFT AC #20 RUNNING D5 1/2NS WITH KCL 20MEQ 
@60ML/HR, PT TOLERATING WELL. PT WILL BE TRANSFERRED TO [facility] FOR CARDIAC CATH 
LAB. PT WILL BE PICKED UP BY TRANSPORT  FOR 1030 APPOINTMENT. SAFETY PRECAUTIONS IN 
PLACE. BED LOW LOCKED POSITION, CALL LIGHT WITHIN REACH. WILL ENDORSE TO DAY SHIFT NURSE FOR 
CONTINUITY OF CARE.

## 2018-02-09 NOTE — NUR
TELE RN NOTES



CALLED [facility]. SPOKE TO RICH TO FOLLOW UP IF THE PT WILL BE RETURNING 
TO UNIT POST CARDIAC CATH PROCEDURE. PER PRAVEENA PT WILL BE ADMITTED POST PROCEDURE TO THEIR 
HOSPITAL. CHARGE NURSE NOTIFIED.

## 2018-02-09 NOTE — NUR
TELE RN NOTES



PT TRANSPORTED STABLE WITH RN AND AMBULANZ STAFF GOING TO [facility] FOR CARDIAC CATH.

## 2018-02-09 NOTE — NUR
TELE RN NOTES



RECEIVED PT ON BED SLEEPING. ALERT ORIENTEDX4. ON NASAL CANNULA 2L SATURATING WELL. ON TELE 
MONITOR SR WITH BBB A PACING HR 80. IV ACCESS ON LAC #20 D51/2NS KCI 20MEQ @60CC/HR. PT NPO. 
HEAD OF BED ELEVATED. SIDE RAILS UP. CALL LIGHT WITHIN REACH. WILL CONTINUE TO MONITOR PT 
CLOSELY

## 2018-02-09 NOTE — NUR
RN NOTES

PT COMPLAINED UNABLE TO SLEEP BECAUSE OF PAIN. WILL ADMINISTER PRN PAIN MEDICATION, MORPHINE 
2MG. WILL CONTINUE TO MONITOR.

## 2018-02-10 LAB
ADD MAN DIFF?: NO
ANION GAP: 11 (ref 8–16)
BASOPHIL #: 0 10^3/UL (ref 0–0.1)
BASOPHILS %: 0.5 % (ref 0–2)
BLOOD UREA NITROGEN: 15 MG/DL (ref 7–20)
CALCIUM: 8.8 MG/DL (ref 8.4–10.2)
CARBON DIOXIDE: 27 MMOL/L (ref 21–31)
CHLORIDE: 106 MMOL/L (ref 97–110)
CREATININE: 0.77 MG/DL (ref 0.61–1.24)
EOSINOPHILS #: 0.2 10^3/UL (ref 0–0.5)
EOSINOPHILS %: 2.4 % (ref 0–7)
GLUCOSE: 123 MG/DL (ref 70–220)
HEMATOCRIT: 35.7 % (ref 42–52)
HEMOGLOBIN: 12 G/DL (ref 14–18)
LYMPHOCYTES #: 1 10^3/UL (ref 0.8–2.9)
LYMPHOCYTES %: 12.4 % (ref 15–51)
MAGNESIUM: 2.1 MG/DL (ref 1.7–2.5)
MEAN CORPUSCULAR HEMOGLOBIN: 30.2 PG (ref 29–33)
MEAN CORPUSCULAR HGB CONC: 33.6 G/DL (ref 32–37)
MEAN CORPUSCULAR VOLUME: 89.7 FL (ref 82–101)
MEAN PLATELET VOLUME: 11.2 FL (ref 7.4–10.4)
MONOCYTE #: 0.9 10^3/UL (ref 0.3–0.9)
MONOCYTES %: 11.2 % (ref 0–11)
NEUTROPHIL #: 6.1 10^3/UL (ref 1.6–7.5)
NEUTROPHILS %: 72.8 % (ref 39–77)
NUCLEATED RED BLOOD CELLS #: 0 10^3/UL (ref 0–0)
NUCLEATED RED BLOOD CELLS%: 0 /100WBC (ref 0–0)
PHOSPHORUS: 2.3 MG/DL (ref 2.5–4.9)
PLATELET COUNT: 165 10^3/UL (ref 140–415)
POTASSIUM: 4.6 MMOL/L (ref 3.5–5.1)
RED BLOOD COUNT: 3.98 10^6/UL (ref 4.7–6.1)
RED CELL DISTRIBUTION WIDTH: 12.8 % (ref 11.5–14.5)
SODIUM: 139 MMOL/L (ref 135–144)
WHITE BLOOD COUNT: 8.3 10^3/UL (ref 4.8–10.8)

## 2018-02-10 RX ADMIN — TICAGRELOR 1 MG: 90 TABLET ORAL at 21:19

## 2018-02-10 RX ADMIN — DOCUSATE SODIUM 1 MG: 250 CAPSULE, LIQUID FILLED ORAL at 21:10

## 2018-02-10 RX ADMIN — HYDROCODONE BITARTRATE AND ACETAMINOPHEN 1 TAB: 5; 325 TABLET ORAL at 16:21

## 2018-02-10 RX ADMIN — ATORVASTATIN CALCIUM 1 MG: 20 TABLET, FILM COATED ORAL at 21:10

## 2018-02-10 RX ADMIN — HYDROCODONE BITARTRATE AND ACETAMINOPHEN 1 TAB: 5; 325 TABLET ORAL at 11:28

## 2018-02-10 RX ADMIN — DOCUSATE SODIUM 1 MG: 250 CAPSULE, LIQUID FILLED ORAL at 10:21

## 2018-02-10 RX ADMIN — ASPIRIN 1 MG: 81 TABLET, COATED ORAL at 08:48

## 2018-02-10 RX ADMIN — ACETAMINOPHEN 1 MG: 325 TABLET, FILM COATED ORAL at 10:26

## 2018-02-10 RX ADMIN — METOPROLOL TARTRATE 1 MG: 25 TABLET ORAL at 21:11

## 2018-02-10 RX ADMIN — LEVETIRACETAM 1 MG: 500 TABLET ORAL at 08:48

## 2018-02-10 RX ADMIN — LEVETIRACETAM 1 MG: 500 TABLET ORAL at 21:10

## 2018-02-10 RX ADMIN — METOPROLOL TARTRATE 1 MG: 25 TABLET ORAL at 08:47

## 2018-02-11 RX ADMIN — LEVETIRACETAM 1 MG: 500 TABLET ORAL at 08:38

## 2018-02-11 RX ADMIN — HYDROCODONE BITARTRATE AND ACETAMINOPHEN 1 TAB: 5; 325 TABLET ORAL at 12:19

## 2018-02-11 RX ADMIN — METOPROLOL TARTRATE 1 MG: 25 TABLET ORAL at 21:01

## 2018-02-11 RX ADMIN — TICAGRELOR 1 MG: 90 TABLET ORAL at 21:02

## 2018-02-11 RX ADMIN — ASPIRIN 1 MG: 81 TABLET, COATED ORAL at 08:37

## 2018-02-11 RX ADMIN — ZOLPIDEM TARTRATE 1 MG: 5 TABLET, FILM COATED ORAL at 22:39

## 2018-02-11 RX ADMIN — ATORVASTATIN CALCIUM 1 MG: 20 TABLET, FILM COATED ORAL at 20:59

## 2018-02-11 RX ADMIN — TICAGRELOR 1 MG: 90 TABLET ORAL at 08:41

## 2018-02-11 RX ADMIN — HYDROCODONE BITARTRATE AND ACETAMINOPHEN 1 TAB: 5; 325 TABLET ORAL at 17:10

## 2018-02-11 RX ADMIN — LOSARTAN POTASSIUM 1 MG: 25 TABLET ORAL at 08:38

## 2018-02-11 RX ADMIN — LEVETIRACETAM 1 MG: 500 TABLET ORAL at 21:00

## 2018-02-11 RX ADMIN — HYDROCODONE BITARTRATE AND ACETAMINOPHEN 1 TAB: 5; 325 TABLET ORAL at 21:00

## 2018-02-11 RX ADMIN — METOPROLOL TARTRATE 1 MG: 25 TABLET ORAL at 08:38

## 2018-02-12 RX ADMIN — LEVETIRACETAM 1 MG: 500 TABLET ORAL at 09:17

## 2018-02-12 RX ADMIN — METOPROLOL TARTRATE 1 MG: 25 TABLET ORAL at 20:58

## 2018-02-12 RX ADMIN — ATORVASTATIN CALCIUM 1 MG: 20 TABLET, FILM COATED ORAL at 20:59

## 2018-02-12 RX ADMIN — HYDROCODONE BITARTRATE AND ACETAMINOPHEN 1 TAB: 5; 325 TABLET ORAL at 14:20

## 2018-02-12 RX ADMIN — HYDROCODONE BITARTRATE AND ACETAMINOPHEN 1 TAB: 5; 325 TABLET ORAL at 01:00

## 2018-02-12 RX ADMIN — HYDROCODONE BITARTRATE AND ACETAMINOPHEN 1 TAB: 5; 325 TABLET ORAL at 09:19

## 2018-02-12 RX ADMIN — HYDROCODONE BITARTRATE AND ACETAMINOPHEN 1 TAB: 5; 325 TABLET ORAL at 15:40

## 2018-02-12 RX ADMIN — TICAGRELOR 1 MG: 90 TABLET ORAL at 21:01

## 2018-02-12 RX ADMIN — HYDROCODONE BITARTRATE AND ACETAMINOPHEN 1 TAB: 5; 325 TABLET ORAL at 20:59

## 2018-02-12 RX ADMIN — ZOLPIDEM TARTRATE 1 MG: 5 TABLET, FILM COATED ORAL at 22:15

## 2018-02-12 RX ADMIN — HYDROCODONE BITARTRATE AND ACETAMINOPHEN 1 TAB: 5; 325 TABLET ORAL at 05:43

## 2018-02-12 RX ADMIN — TICAGRELOR 1 MG: 90 TABLET ORAL at 09:22

## 2018-02-12 RX ADMIN — METOPROLOL TARTRATE 1 MG: 25 TABLET ORAL at 09:18

## 2018-02-12 RX ADMIN — FAMOTIDINE 1 MG: 20 TABLET ORAL at 09:17

## 2018-02-12 RX ADMIN — HYDROCODONE BITARTRATE AND ACETAMINOPHEN 1 TAB: 5; 325 TABLET ORAL at 17:00

## 2018-02-12 RX ADMIN — LEVETIRACETAM 1 MG: 500 TABLET ORAL at 20:59

## 2018-02-12 RX ADMIN — FAMOTIDINE 1 MG: 20 TABLET ORAL at 20:59

## 2018-02-12 RX ADMIN — ASPIRIN 1 MG: 81 TABLET, COATED ORAL at 09:17

## 2018-02-12 RX ADMIN — LOSARTAN POTASSIUM 1 MG: 25 TABLET ORAL at 09:18

## 2018-02-13 LAB
ADD MAN DIFF?: NO
ANION GAP: 11 (ref 8–16)
BASOPHIL #: 0.1 10^3/UL (ref 0–0.1)
BASOPHILS %: 0.5 % (ref 0–2)
BLOOD UREA NITROGEN: 17 MG/DL (ref 7–20)
CALCIUM: 9 MG/DL (ref 8.4–10.2)
CARBON DIOXIDE: 25 MMOL/L (ref 21–31)
CHLORIDE: 105 MMOL/L (ref 97–110)
CREATININE: 0.76 MG/DL (ref 0.61–1.24)
EOSINOPHILS #: 0.4 10^3/UL (ref 0–0.5)
EOSINOPHILS %: 4.5 % (ref 0–7)
GLUCOSE: 123 MG/DL (ref 70–220)
HEMATOCRIT: 33.7 % (ref 42–52)
HEMOGLOBIN: 11.6 G/DL (ref 14–18)
LYMPHOCYTES #: 0.9 10^3/UL (ref 0.8–2.9)
LYMPHOCYTES %: 9.4 % (ref 15–51)
MAGNESIUM: 2.1 MG/DL (ref 1.7–2.5)
MEAN CORPUSCULAR HEMOGLOBIN: 30.3 PG (ref 29–33)
MEAN CORPUSCULAR HGB CONC: 34.4 G/DL (ref 32–37)
MEAN CORPUSCULAR VOLUME: 88 FL (ref 82–101)
MEAN PLATELET VOLUME: 11.1 FL (ref 7.4–10.4)
MONOCYTE #: 1 10^3/UL (ref 0.3–0.9)
MONOCYTES %: 10.4 % (ref 0–11)
NEUTROPHIL #: 7.1 10^3/UL (ref 1.6–7.5)
NEUTROPHILS %: 73.5 % (ref 39–77)
NUCLEATED RED BLOOD CELLS #: 0 10^3/UL (ref 0–0)
NUCLEATED RED BLOOD CELLS%: 0 /100WBC (ref 0–0)
PHOSPHORUS: 3.2 MG/DL (ref 2.5–4.9)
PLATELET COUNT: 204 10^3/UL (ref 140–415)
POTASSIUM: 4.2 MMOL/L (ref 3.5–5.1)
RED BLOOD COUNT: 3.83 10^6/UL (ref 4.7–6.1)
RED CELL DISTRIBUTION WIDTH: 13.2 % (ref 11.5–14.5)
SODIUM: 137 MMOL/L (ref 135–144)
WHITE BLOOD COUNT: 9.6 10^3/UL (ref 4.8–10.8)

## 2018-02-13 RX ADMIN — METOPROLOL TARTRATE 1 MG: 25 TABLET ORAL at 21:01

## 2018-02-13 RX ADMIN — METOPROLOL TARTRATE 1 MG: 25 TABLET ORAL at 08:55

## 2018-02-13 RX ADMIN — HYDROCODONE BITARTRATE AND ACETAMINOPHEN 1 TAB: 5; 325 TABLET ORAL at 08:55

## 2018-02-13 RX ADMIN — HYDROCODONE BITARTRATE AND ACETAMINOPHEN 1 TAB: 5; 325 TABLET ORAL at 01:00

## 2018-02-13 RX ADMIN — HYDROCODONE BITARTRATE AND ACETAMINOPHEN 1 TAB: 5; 325 TABLET ORAL at 21:00

## 2018-02-13 RX ADMIN — TICAGRELOR 1 MG: 90 TABLET ORAL at 21:05

## 2018-02-13 RX ADMIN — LOSARTAN POTASSIUM 1 MG: 25 TABLET ORAL at 08:54

## 2018-02-13 RX ADMIN — TICAGRELOR 1 MG: 90 TABLET ORAL at 09:04

## 2018-02-13 RX ADMIN — HYDROCODONE BITARTRATE AND ACETAMINOPHEN 1 TAB: 5; 325 TABLET ORAL at 05:00

## 2018-02-13 RX ADMIN — ASPIRIN 1 MG: 81 TABLET, COATED ORAL at 08:55

## 2018-02-13 RX ADMIN — FAMOTIDINE 1 MG: 20 TABLET ORAL at 21:03

## 2018-02-13 RX ADMIN — LEVETIRACETAM 1 MG: 500 TABLET ORAL at 08:55

## 2018-02-13 RX ADMIN — FAMOTIDINE 1 MG: 20 TABLET ORAL at 08:56

## 2018-02-13 RX ADMIN — HYDROCODONE BITARTRATE AND ACETAMINOPHEN 1 TAB: 5; 325 TABLET ORAL at 17:00

## 2018-02-13 RX ADMIN — HYDROCODONE BITARTRATE AND ACETAMINOPHEN 1 TAB: 5; 325 TABLET ORAL at 12:50

## 2018-02-13 RX ADMIN — LEVETIRACETAM 1 MG: 500 TABLET ORAL at 21:00

## 2018-02-13 RX ADMIN — ZOLPIDEM TARTRATE 1 MG: 5 TABLET, FILM COATED ORAL at 21:40

## 2018-02-13 RX ADMIN — ATORVASTATIN CALCIUM 1 MG: 20 TABLET, FILM COATED ORAL at 21:00

## 2018-02-14 RX ADMIN — LOSARTAN POTASSIUM 1 MG: 25 TABLET ORAL at 09:30

## 2018-02-14 RX ADMIN — HYDROCODONE BITARTRATE AND ACETAMINOPHEN 1 TAB: 5; 325 TABLET ORAL at 13:00

## 2018-02-14 RX ADMIN — LEVETIRACETAM 1 MG: 500 TABLET ORAL at 09:30

## 2018-02-14 RX ADMIN — HYDROCODONE BITARTRATE AND ACETAMINOPHEN 1 TAB: 5; 325 TABLET ORAL at 21:29

## 2018-02-14 RX ADMIN — TICAGRELOR 1 MG: 90 TABLET ORAL at 21:30

## 2018-02-14 RX ADMIN — ATORVASTATIN CALCIUM 1 MG: 20 TABLET, FILM COATED ORAL at 21:29

## 2018-02-14 RX ADMIN — HYDROCODONE BITARTRATE AND ACETAMINOPHEN 1 TAB: 5; 325 TABLET ORAL at 17:39

## 2018-02-14 RX ADMIN — METOPROLOL TARTRATE 1 MG: 25 TABLET ORAL at 09:30

## 2018-02-14 RX ADMIN — FAMOTIDINE 1 MG: 20 TABLET ORAL at 21:29

## 2018-02-14 RX ADMIN — FAMOTIDINE 1 MG: 20 TABLET ORAL at 09:30

## 2018-02-14 RX ADMIN — METOPROLOL TARTRATE 1 MG: 25 TABLET ORAL at 21:29

## 2018-02-14 RX ADMIN — LEVETIRACETAM 1 MG: 500 TABLET ORAL at 21:28

## 2018-02-14 RX ADMIN — HYDROCODONE BITARTRATE AND ACETAMINOPHEN 1 TAB: 5; 325 TABLET ORAL at 01:00

## 2018-02-14 RX ADMIN — PANTOPRAZOLE SODIUM 1 MG: 40 TABLET, DELAYED RELEASE ORAL at 06:38

## 2018-02-14 RX ADMIN — ASPIRIN 1 MG: 81 TABLET, COATED ORAL at 09:30

## 2018-02-14 RX ADMIN — TICAGRELOR 1 MG: 90 TABLET ORAL at 09:55

## 2018-02-14 RX ADMIN — HYDROCODONE BITARTRATE AND ACETAMINOPHEN 1 TAB: 5; 325 TABLET ORAL at 06:38

## 2018-02-14 RX ADMIN — HYDROCODONE BITARTRATE AND ACETAMINOPHEN 1 TAB: 5; 325 TABLET ORAL at 09:29

## 2018-02-15 LAB
ADD MAN DIFF?: NO
ANION GAP: 13 (ref 8–16)
BASOPHIL #: 0.1 10^3/UL (ref 0–0.1)
BASOPHILS %: 0.7 % (ref 0–2)
BLOOD UREA NITROGEN: 18 MG/DL (ref 7–20)
CALCIUM: 9.1 MG/DL (ref 8.4–10.2)
CARBON DIOXIDE: 25 MMOL/L (ref 21–31)
CHLORIDE: 103 MMOL/L (ref 97–110)
CREATININE: 0.76 MG/DL (ref 0.61–1.24)
EOSINOPHILS #: 0.4 10^3/UL (ref 0–0.5)
EOSINOPHILS %: 3.9 % (ref 0–7)
GLUCOSE: 133 MG/DL (ref 70–220)
HEMATOCRIT: 36.5 % (ref 42–52)
HEMOGLOBIN: 12.4 G/DL (ref 14–18)
LYMPHOCYTES #: 1.5 10^3/UL (ref 0.8–2.9)
LYMPHOCYTES %: 15.8 % (ref 15–51)
MAGNESIUM: 2.4 MG/DL (ref 1.7–2.5)
MEAN CORPUSCULAR HEMOGLOBIN: 30.3 PG (ref 29–33)
MEAN CORPUSCULAR HGB CONC: 34 G/DL (ref 32–37)
MEAN CORPUSCULAR VOLUME: 89.2 FL (ref 82–101)
MEAN PLATELET VOLUME: 10.9 FL (ref 7.4–10.4)
MONOCYTE #: 1 10^3/UL (ref 0.3–0.9)
MONOCYTES %: 10.9 % (ref 0–11)
NEUTROPHIL #: 5.9 10^3/UL (ref 1.6–7.5)
NEUTROPHILS %: 64.2 % (ref 39–77)
NUCLEATED RED BLOOD CELLS #: 0 10^3/UL (ref 0–0)
NUCLEATED RED BLOOD CELLS%: 0 /100WBC (ref 0–0)
PHOSPHORUS: 3.2 MG/DL (ref 2.5–4.9)
PLATELET COUNT: 300 10^3/UL (ref 140–415)
POTASSIUM: 4.3 MMOL/L (ref 3.5–5.1)
RED BLOOD COUNT: 4.09 10^6/UL (ref 4.7–6.1)
RED CELL DISTRIBUTION WIDTH: 13.2 % (ref 11.5–14.5)
SODIUM: 137 MMOL/L (ref 135–144)
WHITE BLOOD COUNT: 9.2 10^3/UL (ref 4.8–10.8)

## 2018-02-15 RX ADMIN — TICAGRELOR 1 MG: 90 TABLET ORAL at 09:01

## 2018-02-15 RX ADMIN — FAMOTIDINE 1 MG: 20 TABLET ORAL at 08:56

## 2018-02-15 RX ADMIN — HYDROCODONE BITARTRATE AND ACETAMINOPHEN 1 TAB: 5; 325 TABLET ORAL at 05:00

## 2018-02-15 RX ADMIN — HYDROCODONE BITARTRATE AND ACETAMINOPHEN 1 TAB: 5; 325 TABLET ORAL at 00:19

## 2018-02-15 RX ADMIN — LEVETIRACETAM 1 MG: 500 TABLET ORAL at 21:02

## 2018-02-15 RX ADMIN — LOSARTAN POTASSIUM 1 MG: 25 TABLET ORAL at 08:56

## 2018-02-15 RX ADMIN — HYDROCODONE BITARTRATE AND ACETAMINOPHEN 1 TAB: 5; 325 TABLET ORAL at 21:03

## 2018-02-15 RX ADMIN — HYDROCODONE BITARTRATE AND ACETAMINOPHEN 1 TAB: 5; 325 TABLET ORAL at 12:46

## 2018-02-15 RX ADMIN — ZOLPIDEM TARTRATE 1 MG: 5 TABLET, FILM COATED ORAL at 21:03

## 2018-02-15 RX ADMIN — HYDROCODONE BITARTRATE AND ACETAMINOPHEN 1 TAB: 5; 325 TABLET ORAL at 08:57

## 2018-02-15 RX ADMIN — TICAGRELOR 1 MG: 90 TABLET ORAL at 21:21

## 2018-02-15 RX ADMIN — FAMOTIDINE 1 MG: 20 TABLET ORAL at 21:02

## 2018-02-15 RX ADMIN — METOPROLOL TARTRATE 1 MG: 25 TABLET ORAL at 21:03

## 2018-02-15 RX ADMIN — ATORVASTATIN CALCIUM 1 MG: 20 TABLET, FILM COATED ORAL at 21:02

## 2018-02-15 RX ADMIN — METOPROLOL TARTRATE 1 MG: 25 TABLET ORAL at 08:56

## 2018-02-15 RX ADMIN — ASPIRIN 1 MG: 81 TABLET, COATED ORAL at 08:56

## 2018-02-15 RX ADMIN — HYDROCODONE BITARTRATE AND ACETAMINOPHEN 1 TAB: 5; 325 TABLET ORAL at 15:13

## 2018-02-15 RX ADMIN — LEVETIRACETAM 1 MG: 500 TABLET ORAL at 08:56

## 2018-02-15 RX ADMIN — PANTOPRAZOLE SODIUM 1 MG: 40 TABLET, DELAYED RELEASE ORAL at 06:41

## 2018-02-15 RX ADMIN — DOCUSATE SODIUM 1 MG: 250 CAPSULE, LIQUID FILLED ORAL at 06:41

## 2018-02-16 RX ADMIN — LEVETIRACETAM 1 MG: 500 TABLET ORAL at 10:16

## 2018-02-16 RX ADMIN — PANTOPRAZOLE SODIUM 1 MG: 40 TABLET, DELAYED RELEASE ORAL at 06:43

## 2018-02-16 RX ADMIN — HYDROCODONE BITARTRATE AND ACETAMINOPHEN 1 TAB: 5; 325 TABLET ORAL at 13:00

## 2018-02-16 RX ADMIN — HYDROCODONE BITARTRATE AND ACETAMINOPHEN 1 TAB: 5; 325 TABLET ORAL at 06:44

## 2018-02-16 RX ADMIN — ASPIRIN 1 MG: 81 TABLET, COATED ORAL at 10:16

## 2018-02-16 RX ADMIN — HYDROCODONE BITARTRATE AND ACETAMINOPHEN 1 TAB: 5; 325 TABLET ORAL at 10:17

## 2018-02-16 RX ADMIN — HYDROCODONE BITARTRATE AND ACETAMINOPHEN 1 TAB: 5; 325 TABLET ORAL at 01:00

## 2018-02-16 RX ADMIN — TICAGRELOR 1 MG: 90 TABLET ORAL at 10:16

## 2018-02-16 RX ADMIN — METOPROLOL TARTRATE 1 MG: 25 TABLET ORAL at 10:17

## 2018-02-16 RX ADMIN — FAMOTIDINE 1 MG: 20 TABLET ORAL at 10:16

## 2018-02-16 RX ADMIN — LOSARTAN POTASSIUM 1 MG: 25 TABLET ORAL at 10:17

## 2018-02-27 ENCOUNTER — HOSPITAL ENCOUNTER (EMERGENCY)
Dept: HOSPITAL 12 - ER | Age: 73
Discharge: HOME | End: 2018-02-27
Payer: MEDICARE

## 2018-02-27 VITALS — SYSTOLIC BLOOD PRESSURE: 131 MMHG | DIASTOLIC BLOOD PRESSURE: 74 MMHG

## 2018-02-27 VITALS — BODY MASS INDEX: 27.15 KG/M2 | WEIGHT: 173 LBS | HEIGHT: 67 IN

## 2018-02-27 DIAGNOSIS — Z79.899: ICD-10-CM

## 2018-02-27 DIAGNOSIS — I10: ICD-10-CM

## 2018-02-27 DIAGNOSIS — Z79.82: ICD-10-CM
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
## 2018-02-27 DIAGNOSIS — Z95.0: ICD-10-CM
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
## 2018-02-27 DIAGNOSIS — S42.202A: Primary | ICD-10-CM
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
## 2018-02-27 LAB
ALP SERPL-CCNC: 271 U/L (ref 50–136)
ALT SERPL W/O P-5'-P-CCNC: 28 U/L (ref 16–63)
AST SERPL-CCNC: 22 U/L (ref 15–37)
BASOPHILS # BLD AUTO: 0 K/UL (ref 0–8)
BASOPHILS NFR BLD AUTO: 0.6 % (ref 0–2)
BILIRUB DIRECT SERPL-MCNC: 0.2 MG/DL (ref 0–0.2)
BILIRUB SERPL-MCNC: 0.8 MG/DL (ref 0.2–1)
BUN SERPL-MCNC: 15 MG/DL (ref 7–18)
CHLORIDE SERPL-SCNC: 100 MMOL/L (ref 98–107)
CO2 SERPL-SCNC: 28 MMOL/L (ref 21–32)
CREAT SERPL-MCNC: 1 MG/DL (ref 0.6–1.3)
EOSINOPHIL # BLD AUTO: 0.1 K/UL (ref 0–0.7)
EOSINOPHIL NFR BLD AUTO: 2.1 % (ref 0–7)
GLUCOSE SERPL-MCNC: 99 MG/DL (ref 74–106)
HCT VFR BLD AUTO: 36.8 % (ref 36.7–47.1)
HGB BLD-MCNC: 12.5 G/DL (ref 12.5–16.3)
LYMPHOCYTES # BLD AUTO: 1.3 K/UL (ref 20–40)
LYMPHOCYTES NFR BLD AUTO: 19.2 % (ref 20.5–51.5)
MCH RBC QN AUTO: 30.5 UUG (ref 23.8–33.4)
MCHC RBC AUTO-ENTMCNC: 34 G/DL (ref 32.5–36.3)
MCV RBC AUTO: 90.3 FL (ref 73–96.2)
MONOCYTES # BLD AUTO: 0.8 K/UL (ref 2–10)
MONOCYTES NFR BLD AUTO: 11.3 % (ref 0–11)
NEUTROPHILS # BLD AUTO: 4.7 K/UL (ref 1.8–8.9)
NEUTROPHILS NFR BLD AUTO: 66.8 % (ref 38.5–71.5)
PLATELET # BLD AUTO: 222 K/UL (ref 152–348)
POTASSIUM SERPL-SCNC: 4.3 MMOL/L (ref 3.5–5.1)
RBC # BLD AUTO: 4.08 MIL/UL (ref 4.06–5.63)
WBC # BLD AUTO: 7 K/UL (ref 3.6–10.2)
WS STN SPEC: 7.3 G/DL (ref 6.4–8.2)

## 2018-02-27 PROCEDURE — A4663 DIALYSIS BLOOD PRESSURE CUFF: HCPCS

## 2018-02-27 NOTE — NUR
Assumed care of patient. patient in bed, no acute distress noted. family at 
bedside. Returned from CT.

## 2018-02-27 NOTE — NUR
Patient discharged to home in stable conditon.  Written and verbal after care 
instructions given. 

Patient verbalizes understanding of instructions. Ambulated from ER with stable 
gait. patient is accompanied by wife, and will be driven home in private 
vehicle All belongings taken with patient.

## 2018-07-03 ENCOUNTER — HOSPITAL ENCOUNTER (OUTPATIENT)
Dept: HOSPITAL 54 - RAD | Age: 73
Discharge: HOME | End: 2018-07-03
Attending: FAMILY MEDICINE
Payer: MEDICARE

## 2018-07-03 DIAGNOSIS — M79.89: Primary | ICD-10-CM

## 2018-10-19 ENCOUNTER — HOSPITAL ENCOUNTER (OUTPATIENT)
Dept: HOSPITAL 54 - RAD | Age: 73
Discharge: HOME | End: 2018-10-19
Attending: FAMILY MEDICINE
Payer: MEDICARE

## 2018-10-19 DIAGNOSIS — Z86.19: ICD-10-CM

## 2018-10-19 DIAGNOSIS — M19.011: Primary | ICD-10-CM

## 2018-10-19 DIAGNOSIS — I10: ICD-10-CM

## 2018-11-13 ENCOUNTER — OFFICE (OUTPATIENT)
Dept: URBAN - METROPOLITAN AREA CLINIC 45 | Facility: CLINIC | Age: 73
End: 2018-11-13

## 2018-11-13 VITALS
SYSTOLIC BLOOD PRESSURE: 117 MMHG | WEIGHT: 175 LBS | HEIGHT: 66 IN | TEMPERATURE: 97.4 F | HEART RATE: 60 BPM | DIASTOLIC BLOOD PRESSURE: 57 MMHG

## 2018-11-13 DIAGNOSIS — R10.11 RIGHT UPPER QUADRANT PAIN: ICD-10-CM

## 2018-11-13 DIAGNOSIS — I25.10 CORONARY ARTERY DISEASE: ICD-10-CM

## 2018-11-13 DIAGNOSIS — B18.2 HEPATITIS C CHRONIC: ICD-10-CM

## 2018-11-13 DIAGNOSIS — K59.00 CONSTIPATION: ICD-10-CM

## 2018-11-13 PROCEDURE — 99214 OFFICE O/P EST MOD 30 MIN: CPT | Performed by: INTERNAL MEDICINE

## 2018-11-13 NOTE — SERVICEHPINOTES
We have followed the patient for   pain predominant   irritable bowel syndrome.    Currently on treatment with   PPI therapy  .    Baseline clinical course has   improved   since last visit.    The patient   has   been doing well overall.    Currently having   5   bowel movement(s) per   week  .    Active GI symptoms include   RUQ abdominal pain  .    They are considered by the patient to be   mild   in nature.    Alarm symptoms reported by the patient include   none  .    Symptoms have recently caused the patient to   see primary care physician  .   Chronic RUQ discomfort seems worse when constipated, relieved occ with bm. NO wt loss, fever or chills. No n/v. Pt has hx of Hep C treated at [facility]. Recent RNA quant titer neg. MI last year on Brilinta.

## 2018-12-20 ENCOUNTER — OFFICE (OUTPATIENT)
Dept: URBAN - METROPOLITAN AREA CLINIC 45 | Facility: CLINIC | Age: 73
End: 2018-12-20

## 2018-12-20 VITALS
SYSTOLIC BLOOD PRESSURE: 130 MMHG | HEIGHT: 66 IN | DIASTOLIC BLOOD PRESSURE: 60 MMHG | WEIGHT: 170 LBS | TEMPERATURE: 98 F | HEART RATE: 63 BPM

## 2018-12-20 DIAGNOSIS — K86.1 CHRONIC PANCREATITIS: ICD-10-CM

## 2018-12-20 DIAGNOSIS — I25.10 CORONARY ARTERY DISEASE: ICD-10-CM

## 2018-12-20 DIAGNOSIS — R10.11 RIGHT UPPER QUADRANT PAIN: ICD-10-CM

## 2018-12-20 DIAGNOSIS — B18.2 HEPATITIS C CHRONIC: ICD-10-CM

## 2018-12-20 DIAGNOSIS — R63.4 ABNORMAL WEIGHT LOSS: ICD-10-CM

## 2018-12-20 PROCEDURE — 99213 OFFICE O/P EST LOW 20 MIN: CPT | Performed by: INTERNAL MEDICINE

## 2018-12-20 NOTE — SERVICEHPINOTES
We have followed the patient for pain predominant irritable bowel syndrome. Currently on treatment with PPI therapy. Baseline clinical course has improved since last visit. The patient has been doing well overall. Currently having 5 bowel movement(s) per week. Active GI symptoms include RUQ abdominal pain. They are considered by the patient to be mild in nature. Alarm symptoms reported by the patient include none. Symptoms have recently caused the patient to see primary care physician. Chronic RUQ discomfort seems worse when constipated, relieved occ with bm. NO wt loss, fever or chills. No n/v. Pt has hx of Hep C treated at [facility]. Recent RNA quant titer neg. MI last year on Brilinta. Pain has persisted and recent CT scan shows chronic calcific pancreatits and patient has lost few #s because pain apparently worse after eating.

## 2018-12-22 LAB
AMYLASE: 52 U/L (ref 21–101)
CA 19-9: 23 U/ML (ref ?–34)
CBC (INCLUDES DIFF/PLT): ABSOLUTE BAND NEUTROPHILS: NORMAL CELLS/UL
CBC (INCLUDES DIFF/PLT): ABSOLUTE BASOPHILS: 28 CELLS/UL (ref 0–200)
CBC (INCLUDES DIFF/PLT): ABSOLUTE BLASTS: NORMAL CELLS/UL
CBC (INCLUDES DIFF/PLT): ABSOLUTE EOSINOPHILS: 48 CELLS/UL (ref 15–500)
CBC (INCLUDES DIFF/PLT): ABSOLUTE LYMPHOCYTES: 938 CELLS/UL (ref 850–3900)
CBC (INCLUDES DIFF/PLT): ABSOLUTE METAMYELOCYTES: NORMAL CELLS/UL
CBC (INCLUDES DIFF/PLT): ABSOLUTE MONOCYTES: 593 CELLS/UL (ref 200–950)
CBC (INCLUDES DIFF/PLT): ABSOLUTE MYELOCYTES: NORMAL CELLS/UL
CBC (INCLUDES DIFF/PLT): ABSOLUTE NEUTROPHILS: 5292 CELLS/UL (ref 1500–7800)
CBC (INCLUDES DIFF/PLT): ABSOLUTE NUCLEATED RBC: 0 CELLS/UL (ref 0–?)
CBC (INCLUDES DIFF/PLT): ABSOLUTE PROMYELOCYTES: NORMAL CELLS/UL
CBC (INCLUDES DIFF/PLT): BAND NEUTROPHILS: NORMAL %
CBC (INCLUDES DIFF/PLT): BASOPHILS: 0.4 %
CBC (INCLUDES DIFF/PLT): BLASTS: NORMAL %
CBC (INCLUDES DIFF/PLT): COMMENT(S): NORMAL
CBC (INCLUDES DIFF/PLT): EOSINOPHILS: 0.7 %
CBC (INCLUDES DIFF/PLT): HEMATOCRIT: 42 % (ref 38.5–50)
CBC (INCLUDES DIFF/PLT): HEMOGLOBIN: 14.2 G/DL (ref 13.2–17.1)
CBC (INCLUDES DIFF/PLT): LYMPHOCYTES: 13.6 %
CBC (INCLUDES DIFF/PLT): MCH: 30.3 PG (ref 27–33)
CBC (INCLUDES DIFF/PLT): MCHC: 33.8 G/DL (ref 32–36)
CBC (INCLUDES DIFF/PLT): MCV: 89.6 FL (ref 80–100)
CBC (INCLUDES DIFF/PLT): METAMYELOCYTES: NORMAL %
CBC (INCLUDES DIFF/PLT): MONOCYTES: 8.6 %
CBC (INCLUDES DIFF/PLT): MPV: 11.6 FL (ref 7.5–12.5)
CBC (INCLUDES DIFF/PLT): MYELOCYTES: NORMAL %
CBC (INCLUDES DIFF/PLT): NEUTROPHILS: 76.7 %
CBC (INCLUDES DIFF/PLT): NUCLEATED RBC: NORMAL /100 WBC
CBC (INCLUDES DIFF/PLT): PLATELET COUNT: 180 THOUSAND/UL (ref 140–400)
CBC (INCLUDES DIFF/PLT): PROMYELOCYTES: NORMAL %
CBC (INCLUDES DIFF/PLT): RDW: 13.1 % (ref 11–15)
CBC (INCLUDES DIFF/PLT): REACTIVE LYMPHOCYTES: NORMAL %
CBC (INCLUDES DIFF/PLT): RED BLOOD CELL COUNT: 4.69 MILLION/UL (ref 4.2–5.8)
CBC (INCLUDES DIFF/PLT): WHITE BLOOD CELL COUNT: 6.9 THOUSAND/UL (ref 3.8–10.8)
COMPREHENSIVE METABOLIC PANEL: ALBUMIN/GLOBULIN RATIO: 1.9 (CALC) (ref 1–2.5)
COMPREHENSIVE METABOLIC PANEL: ALBUMIN: 4.8 G/DL (ref 3.6–5.1)
COMPREHENSIVE METABOLIC PANEL: ALKALINE PHOSPHATASE: 108 U/L (ref 40–115)
COMPREHENSIVE METABOLIC PANEL: ALT: 18 U/L (ref 9–46)
COMPREHENSIVE METABOLIC PANEL: AST: 22 U/L (ref 10–35)
COMPREHENSIVE METABOLIC PANEL: BILIRUBIN, TOTAL: 1 MG/DL (ref 0.2–1.2)
COMPREHENSIVE METABOLIC PANEL: BUN/CREATININE RATIO: (no result) (CALC)
COMPREHENSIVE METABOLIC PANEL: CALCIUM: 10 MG/DL (ref 8.6–10.3)
COMPREHENSIVE METABOLIC PANEL: CARBON DIOXIDE: 25 MMOL/L (ref 20–32)
COMPREHENSIVE METABOLIC PANEL: CHLORIDE: 101 MMOL/L (ref 98–110)
COMPREHENSIVE METABOLIC PANEL: CREATININE: 0.85 MG/DL (ref 0.7–1.18)
COMPREHENSIVE METABOLIC PANEL: EGFR AFRICAN AMERICAN: 102 ML/MIN/1.73M2 (ref 60–?)
COMPREHENSIVE METABOLIC PANEL: EGFR NON-AFR. AMERICAN: 88 ML/MIN/1.73M2 (ref 60–?)
COMPREHENSIVE METABOLIC PANEL: GLOBULIN: 2.5 G/DL (CALC) (ref 1.9–3.7)
COMPREHENSIVE METABOLIC PANEL: GLUCOSE: 104 MG/DL (ref 65–139)
COMPREHENSIVE METABOLIC PANEL: POTASSIUM: 4.5 MMOL/L (ref 3.5–5.3)
COMPREHENSIVE METABOLIC PANEL: PROTEIN, TOTAL: 7.3 G/DL (ref 6.1–8.1)
COMPREHENSIVE METABOLIC PANEL: SODIUM: 137 MMOL/L (ref 135–146)
COMPREHENSIVE METABOLIC PANEL: UREA NITROGEN (BUN): 13 MG/DL (ref 7–25)
LIPASE: 13 U/L (ref 7–60)

## 2018-12-27 LAB — PANCREATIC ELASTASE-1: >500 MCG/G

## 2022-12-08 ENCOUNTER — OFFICE (OUTPATIENT)
Dept: URBAN - METROPOLITAN AREA CLINIC 57 | Facility: CLINIC | Age: 77
End: 2022-12-08

## 2022-12-08 VITALS
DIASTOLIC BLOOD PRESSURE: 82 MMHG | HEART RATE: 75 BPM | SYSTOLIC BLOOD PRESSURE: 142 MMHG | TEMPERATURE: 98.4 F | HEIGHT: 66 IN | WEIGHT: 175 LBS | RESPIRATION RATE: 16 BRPM

## 2022-12-08 DIAGNOSIS — E78.5 HYPERLIPIDEMIA, UNSPECIFIED: ICD-10-CM

## 2022-12-08 DIAGNOSIS — I10 HYPERTENSION: ICD-10-CM

## 2022-12-08 DIAGNOSIS — Z95.5 STENTED CORONARY ARTERY: ICD-10-CM

## 2022-12-08 DIAGNOSIS — C22.0 CANCER, HEPATOCELLULAR: ICD-10-CM

## 2022-12-08 DIAGNOSIS — Z86.010 PERSONAL HISTORY OF COLONIC POLYPS: ICD-10-CM

## 2022-12-08 DIAGNOSIS — K74.60 CIRRHOSIS OF LIVER DUE TO CHRONIC HEPATITIS C: ICD-10-CM

## 2022-12-08 PROCEDURE — 99204 OFFICE O/P NEW MOD 45 MIN: CPT | Performed by: INTERNAL MEDICINE

## 2022-12-08 NOTE — SERVICEHPINOTES
The patient is a 77-year-old gentleman accompanied by his wife. The patient has seen a number of gastroenterologists in the past. Apparently he had hepatitis C and developed cirrhosis. He was also found to have a hepatocellular carcinoma. He was treated with TACE. His most recent endoscopy in 2019 showed grade 1 varices. He was currently seen by Dr. Boss who suggested endoscopy to look for varices. Although the patient thinks he last had a colonoscopy 10 years ago, his records show that 5 years ago he had a colonoscopy by Dr. Perez at which time a diminutive sessile serrated adenoma was removed from the ascending colon. Most recent laboratory data from July shows hemoglobin 14 hematocrit 41 platelets 234 PT INR 1.1. He does describe problems with constipation and bloating. He denies any rectal bleeding or melena.

## 2023-01-17 VITALS
RESPIRATION RATE: 16 BRPM | HEIGHT: 66 IN | WEIGHT: 175 LBS | TEMPERATURE: 98 F | DIASTOLIC BLOOD PRESSURE: 82 MMHG | OXYGEN SATURATION: 99 % | HEART RATE: 75 BPM | SYSTOLIC BLOOD PRESSURE: 159 MMHG

## 2023-01-18 ENCOUNTER — AMBULATORY SURGICAL CENTER (OUTPATIENT)
Dept: URBAN - METROPOLITAN AREA SURGERY 34 | Facility: SURGERY | Age: 78
End: 2023-01-18

## 2023-01-18 DIAGNOSIS — K22.10 ULCER OF ESOPHAGUS WITHOUT BLEEDING: ICD-10-CM

## 2023-01-18 DIAGNOSIS — K74.60 UNSPECIFIED CIRRHOSIS OF LIVER: ICD-10-CM

## 2023-01-18 DIAGNOSIS — I85.00 ESOPHAGEAL VARICES WITHOUT BLEEDING: ICD-10-CM

## 2023-01-18 PROCEDURE — 43235 EGD DIAGNOSTIC BRUSH WASH: CPT

## 2023-01-18 RX ORDER — PANTOPRAZOLE 40 MG/1
40 TABLET, DELAYED RELEASE ORAL
Qty: 90 | Status: ACTIVE
Start: 2023-01-18

## 2023-05-10 ENCOUNTER — HOSPITAL ENCOUNTER (EMERGENCY)
Dept: HOSPITAL 12 - ER | Age: 78
LOS: 1 days | Discharge: HOME | End: 2023-05-11
Payer: MEDICARE

## 2023-05-10 VITALS — BODY MASS INDEX: 28.12 KG/M2 | HEIGHT: 66 IN | WEIGHT: 175 LBS

## 2023-05-10 DIAGNOSIS — I80.02: ICD-10-CM

## 2023-05-10 DIAGNOSIS — Z95.0: ICD-10-CM

## 2023-05-10 DIAGNOSIS — L03.116: Primary | ICD-10-CM

## 2023-05-10 DIAGNOSIS — Z79.899: ICD-10-CM

## 2023-05-10 DIAGNOSIS — Z79.82: ICD-10-CM

## 2023-05-10 PROCEDURE — 93971 EXTREMITY STUDY: CPT

## 2023-05-10 PROCEDURE — 85379 FIBRIN DEGRADATION QUANT: CPT

## 2023-05-10 PROCEDURE — 96365 THER/PROPH/DIAG IV INF INIT: CPT

## 2023-05-10 PROCEDURE — 85025 COMPLETE CBC W/AUTO DIFF WBC: CPT

## 2023-05-10 PROCEDURE — A4663 DIALYSIS BLOOD PRESSURE CUFF: HCPCS

## 2023-05-10 PROCEDURE — 99285 EMERGENCY DEPT VISIT HI MDM: CPT

## 2023-05-10 PROCEDURE — 87040 BLOOD CULTURE FOR BACTERIA: CPT

## 2023-05-10 PROCEDURE — 96366 THER/PROPH/DIAG IV INF ADDON: CPT

## 2023-05-10 PROCEDURE — 36415 COLL VENOUS BLD VENIPUNCTURE: CPT

## 2023-05-10 PROCEDURE — 80048 BASIC METABOLIC PNL TOTAL CA: CPT

## 2023-05-11 VITALS — DIASTOLIC BLOOD PRESSURE: 67 MMHG | SYSTOLIC BLOOD PRESSURE: 126 MMHG

## 2023-05-11 LAB
BUN SERPL-MCNC: 21 MG/DL (ref 7–18)
CHLORIDE SERPL-SCNC: 98 MMOL/L (ref 98–107)
CO2 SERPL-SCNC: 27 MMOL/L (ref 21–32)
CREAT SERPL-MCNC: 1.1 MG/DL (ref 0.6–1.3)
GLUCOSE SERPL-MCNC: 116 MG/DL (ref 74–106)
HCT VFR BLD AUTO: 38.7 % (ref 36.7–47.1)
MCH RBC QN AUTO: 30.5 UUG (ref 23.8–33.4)
MCV RBC AUTO: 91 FL (ref 73–96.2)
PLATELET # BLD AUTO: 129 K/UL (ref 152–348)
POTASSIUM SERPL-SCNC: 3.9 MMOL/L (ref 3.5–5.1)

## 2023-05-11 NOTE — NUR
PT A,A AND O X 4 WITH L L E PAIN DECREASED TO 4/10,VSS AND NAD OBSERVED.PT'S IV 
D/C'D. SITE C/D/I. Patient discharged to home in stable condition.  Written and 
verbal after care instructions given. 

Patient verbalizes understanding of instructions. Stressed follow up or return 
to ER for worsening s/s.PT AMB OUT WITH STEADY GAIT WITH WIFE.

## 2023-06-14 ENCOUNTER — OFFICE (OUTPATIENT)
Dept: URBAN - METROPOLITAN AREA CLINIC 57 | Facility: CLINIC | Age: 78
End: 2023-06-14

## 2023-06-14 VITALS
HEIGHT: 66 IN | DIASTOLIC BLOOD PRESSURE: 88 MMHG | RESPIRATION RATE: 18 BRPM | TEMPERATURE: 97.6 F | HEART RATE: 77 BPM | SYSTOLIC BLOOD PRESSURE: 150 MMHG | WEIGHT: 175 LBS

## 2023-06-14 DIAGNOSIS — C22.0 CANCER, HEPATOCELLULAR: ICD-10-CM

## 2023-06-14 DIAGNOSIS — K74.60 CIRRHOSIS OF LIVER DUE TO CHRONIC HEPATITIS C: ICD-10-CM

## 2023-06-14 DIAGNOSIS — I25.10 CORONARY ARTERY DISEASE: ICD-10-CM

## 2023-06-14 DIAGNOSIS — Z86.010 PERSONAL HISTORY OF COLONIC POLYPS: ICD-10-CM

## 2023-06-14 DIAGNOSIS — Z95.2 S/P AVR: ICD-10-CM

## 2023-06-14 PROCEDURE — 99214 OFFICE O/P EST MOD 30 MIN: CPT | Performed by: INTERNAL MEDICINE

## 2023-06-14 RX ORDER — PANTOPRAZOLE 40 MG/1
40 TABLET, DELAYED RELEASE ORAL
Qty: 90 | Status: ACTIVE
Start: 2023-01-18

## 2023-09-01 VITALS
WEIGHT: 174 LBS | TEMPERATURE: 97.5 F | SYSTOLIC BLOOD PRESSURE: 137 MMHG | HEIGHT: 66 IN | HEART RATE: 75 BPM | DIASTOLIC BLOOD PRESSURE: 79 MMHG | OXYGEN SATURATION: 94 % | RESPIRATION RATE: 13 BRPM

## 2023-09-05 ENCOUNTER — AMBULATORY SURGICAL CENTER (OUTPATIENT)
Dept: URBAN - METROPOLITAN AREA SURGERY 34 | Facility: SURGERY | Age: 78
End: 2023-09-05

## 2023-09-05 DIAGNOSIS — Z86.010 PERSONAL HISTORY OF COLONIC POLYPS: ICD-10-CM

## 2023-09-05 DIAGNOSIS — K63.5 POLYP OF COLON: ICD-10-CM

## 2023-09-05 PROCEDURE — 45380 COLONOSCOPY AND BIOPSY: CPT | Performed by: INTERNAL MEDICINE

## 2024-05-25 ENCOUNTER — HOSPITAL ENCOUNTER (INPATIENT)
Dept: HOSPITAL 54 - ER | Age: 79
LOS: 2 days | Discharge: HOME | DRG: 74 | End: 2024-05-27
Attending: NURSE PRACTITIONER | Admitting: NURSE PRACTITIONER
Payer: MEDICARE

## 2024-05-25 VITALS — SYSTOLIC BLOOD PRESSURE: 124 MMHG | TEMPERATURE: 98.6 F | OXYGEN SATURATION: 95 % | DIASTOLIC BLOOD PRESSURE: 62 MMHG

## 2024-05-25 VITALS — DIASTOLIC BLOOD PRESSURE: 70 MMHG | SYSTOLIC BLOOD PRESSURE: 118 MMHG | OXYGEN SATURATION: 95 % | TEMPERATURE: 98.8 F

## 2024-05-25 VITALS — SYSTOLIC BLOOD PRESSURE: 113 MMHG | DIASTOLIC BLOOD PRESSURE: 64 MMHG | TEMPERATURE: 98.8 F | OXYGEN SATURATION: 95 %

## 2024-05-25 VITALS — BODY MASS INDEX: 26.36 KG/M2 | HEIGHT: 66 IN | WEIGHT: 164 LBS

## 2024-05-25 DIAGNOSIS — Z92.21: ICD-10-CM

## 2024-05-25 DIAGNOSIS — M19.90: ICD-10-CM

## 2024-05-25 DIAGNOSIS — M48.54XA: ICD-10-CM

## 2024-05-25 DIAGNOSIS — R55: ICD-10-CM

## 2024-05-25 DIAGNOSIS — Z95.2: ICD-10-CM

## 2024-05-25 DIAGNOSIS — Z86.19: ICD-10-CM

## 2024-05-25 DIAGNOSIS — E78.5: ICD-10-CM

## 2024-05-25 DIAGNOSIS — R74.8: ICD-10-CM

## 2024-05-25 DIAGNOSIS — I10: ICD-10-CM

## 2024-05-25 DIAGNOSIS — D64.9: ICD-10-CM

## 2024-05-25 DIAGNOSIS — Z85.05: ICD-10-CM

## 2024-05-25 DIAGNOSIS — D69.6: ICD-10-CM

## 2024-05-25 DIAGNOSIS — E83.51: ICD-10-CM

## 2024-05-25 DIAGNOSIS — G90.8: Primary | ICD-10-CM

## 2024-05-25 DIAGNOSIS — Z95.0: ICD-10-CM

## 2024-05-25 DIAGNOSIS — N40.0: ICD-10-CM

## 2024-05-25 LAB
ALBUMIN SERPL BCP-MCNC: 3.6 G/DL (ref 3.4–5)
ALP SERPL-CCNC: 140 U/L (ref 46–116)
ALT SERPL W P-5'-P-CCNC: 36 U/L (ref 12–78)
AST SERPL W P-5'-P-CCNC: 35 U/L (ref 15–37)
BASOPHILS # BLD AUTO: 0.1 K/UL (ref 0–0.2)
BASOPHILS NFR BLD AUTO: 0.6 % (ref 0–2)
BILIRUB DIRECT SERPL-MCNC: 0.2 MG/DL (ref 0–0.2)
BILIRUB SERPL-MCNC: 0.5 MG/DL (ref 0.2–1)
BUN SERPL-MCNC: 19 MG/DL (ref 7–18)
CALCIUM SERPL-MCNC: 9.2 MG/DL (ref 8.5–10.1)
CHLORIDE SERPL-SCNC: 102 MMOL/L (ref 98–107)
CHOLEST SERPL-MCNC: 90 MG/DL (ref ?–200)
CO2 SERPL-SCNC: 23 MMOL/L (ref 21–32)
CREAT SERPL-MCNC: 1.1 MG/DL (ref 0.6–1.3)
EOSINOPHIL # BLD AUTO: 0.2 K/UL (ref 0–0.7)
EOSINOPHIL NFR BLD AUTO: 1.9 % (ref 0–6)
ERYTHROCYTE [DISTWIDTH] IN BLOOD BY AUTOMATED COUNT: 14.4 % (ref 11.5–15)
GLUCOSE SERPL-MCNC: 125 MG/DL (ref 74–106)
HCT VFR BLD AUTO: 41 % (ref 39–51)
HDLC SERPL-MCNC: 53 MG/DL (ref 40–60)
HGB BLD-MCNC: 13.5 G/DL (ref 13.5–17.5)
LDLC SERPL DIRECT ASSAY-MCNC: 31 MG/DL (ref 0–99)
LYMPHOCYTES NFR BLD AUTO: 2.4 K/UL (ref 0.8–4.8)
LYMPHOCYTES NFR BLD AUTO: 25.7 % (ref 20–44)
MCH RBC QN AUTO: 30 PG (ref 26–33)
MCHC RBC AUTO-ENTMCNC: 33 G/DL (ref 31–36)
MCV RBC AUTO: 91 FL (ref 80–96)
MONOCYTES NFR BLD AUTO: 0.8 K/UL (ref 0.1–1.3)
MONOCYTES NFR BLD AUTO: 9 % (ref 2–12)
NEUTROPHILS # BLD AUTO: 5.8 K/UL (ref 1.8–8.9)
NEUTROPHILS NFR BLD AUTO: 62.8 % (ref 43–81)
PLATELET # BLD AUTO: 197 K/UL (ref 150–450)
POTASSIUM SERPL-SCNC: 4.4 MMOL/L (ref 3.5–5.1)
PROT SERPL-MCNC: 7.5 G/DL (ref 6.4–8.2)
RBC # BLD AUTO: 4.47 MIL/UL (ref 4.5–6)
SODIUM SERPL-SCNC: 136 MMOL/L (ref 136–145)
TRIGL SERPL-MCNC: 22 MG/DL (ref 30–150)
TSH SERPL DL<=0.005 MIU/L-ACNC: 0.69 UIU/ML (ref 0.36–3.74)
WBC NRBC COR # BLD AUTO: 9.3 K/UL (ref 4.3–11)

## 2024-05-25 PROCEDURE — G0378 HOSPITAL OBSERVATION PER HR: HCPCS

## 2024-05-25 RX ADMIN — ATORVASTATIN CALCIUM SCH MG: 10 TABLET, FILM COATED ORAL at 17:07

## 2024-05-25 RX ADMIN — PANTOPRAZOLE SODIUM SCH MG: 40 TABLET, DELAYED RELEASE ORAL at 12:00

## 2024-05-25 RX ADMIN — LACOSAMIDE SCH MG: 50 TABLET, FILM COATED ORAL at 13:11

## 2024-05-25 RX ADMIN — ENOXAPARIN SODIUM SCH MG: 40 INJECTION SUBCUTANEOUS at 21:00

## 2024-05-26 VITALS — OXYGEN SATURATION: 96 % | SYSTOLIC BLOOD PRESSURE: 128 MMHG | TEMPERATURE: 98.1 F | DIASTOLIC BLOOD PRESSURE: 74 MMHG

## 2024-05-26 VITALS — OXYGEN SATURATION: 98 % | DIASTOLIC BLOOD PRESSURE: 75 MMHG | TEMPERATURE: 98.2 F | SYSTOLIC BLOOD PRESSURE: 119 MMHG

## 2024-05-26 VITALS — SYSTOLIC BLOOD PRESSURE: 148 MMHG | DIASTOLIC BLOOD PRESSURE: 73 MMHG | TEMPERATURE: 98.4 F | OXYGEN SATURATION: 98 %

## 2024-05-26 VITALS — DIASTOLIC BLOOD PRESSURE: 70 MMHG | SYSTOLIC BLOOD PRESSURE: 118 MMHG | TEMPERATURE: 98.8 F | OXYGEN SATURATION: 95 %

## 2024-05-26 VITALS — OXYGEN SATURATION: 97 % | SYSTOLIC BLOOD PRESSURE: 132 MMHG | TEMPERATURE: 98.2 F | DIASTOLIC BLOOD PRESSURE: 73 MMHG

## 2024-05-26 VITALS — OXYGEN SATURATION: 96 % | TEMPERATURE: 98.1 F | DIASTOLIC BLOOD PRESSURE: 74 MMHG | SYSTOLIC BLOOD PRESSURE: 128 MMHG

## 2024-05-26 VITALS — OXYGEN SATURATION: 96 % | DIASTOLIC BLOOD PRESSURE: 71 MMHG | TEMPERATURE: 98.4 F | SYSTOLIC BLOOD PRESSURE: 120 MMHG

## 2024-05-26 LAB
BASOPHILS # BLD AUTO: 0 K/UL (ref 0–0.2)
BASOPHILS NFR BLD AUTO: 0.4 % (ref 0–2)
BUN SERPL-MCNC: 12 MG/DL (ref 7–18)
CALCIUM SERPL-MCNC: 8.3 MG/DL (ref 8.5–10.1)
CHLORIDE SERPL-SCNC: 103 MMOL/L (ref 98–107)
CO2 SERPL-SCNC: 28 MMOL/L (ref 21–32)
CREAT SERPL-MCNC: 1.1 MG/DL (ref 0.6–1.3)
EOSINOPHIL # BLD AUTO: 0.1 K/UL (ref 0–0.7)
EOSINOPHIL NFR BLD AUTO: 2 % (ref 0–6)
ERYTHROCYTE [DISTWIDTH] IN BLOOD BY AUTOMATED COUNT: 14.1 % (ref 11.5–15)
GLUCOSE SERPL-MCNC: 105 MG/DL (ref 74–106)
HCT VFR BLD AUTO: 39 % (ref 39–51)
HGB BLD-MCNC: 13.3 G/DL (ref 13.5–17.5)
LYMPHOCYTES NFR BLD AUTO: 0.8 K/UL (ref 0.8–4.8)
LYMPHOCYTES NFR BLD AUTO: 16.8 % (ref 20–44)
MAGNESIUM SERPL-MCNC: 2.4 MG/DL (ref 1.8–2.4)
MCH RBC QN AUTO: 31 PG (ref 26–33)
MCHC RBC AUTO-ENTMCNC: 34 G/DL (ref 31–36)
MCV RBC AUTO: 90 FL (ref 80–96)
MONOCYTES NFR BLD AUTO: 0.5 K/UL (ref 0.1–1.3)
MONOCYTES NFR BLD AUTO: 11 % (ref 2–12)
NEUTROPHILS # BLD AUTO: 3.3 K/UL (ref 1.8–8.9)
NEUTROPHILS NFR BLD AUTO: 69.8 % (ref 43–81)
PHOSPHATE SERPL-MCNC: 2.7 MG/DL (ref 2.5–4.9)
PLATELET # BLD AUTO: 134 K/UL (ref 150–450)
POTASSIUM SERPL-SCNC: 3.8 MMOL/L (ref 3.5–5.1)
RBC # BLD AUTO: 4.34 MIL/UL (ref 4.5–6)
SODIUM SERPL-SCNC: 137 MMOL/L (ref 136–145)
WBC NRBC COR # BLD AUTO: 4.7 K/UL (ref 4.3–11)

## 2024-05-26 RX ADMIN — LOSARTAN POTASSIUM SCH MG: 25 TABLET, FILM COATED ORAL at 08:19

## 2024-05-26 RX ADMIN — METOPROLOL SUCCINATE SCH MG: 25 TABLET, EXTENDED RELEASE ORAL at 08:20

## 2024-05-26 RX ADMIN — PANTOPRAZOLE SODIUM SCH MG: 40 TABLET, DELAYED RELEASE ORAL at 07:49

## 2024-05-27 VITALS — DIASTOLIC BLOOD PRESSURE: 70 MMHG | SYSTOLIC BLOOD PRESSURE: 111 MMHG | TEMPERATURE: 98.1 F | OXYGEN SATURATION: 95 %

## 2024-05-27 VITALS — OXYGEN SATURATION: 96 % | SYSTOLIC BLOOD PRESSURE: 136 MMHG | TEMPERATURE: 98.1 F | DIASTOLIC BLOOD PRESSURE: 74 MMHG

## 2024-05-27 VITALS — TEMPERATURE: 98.1 F | DIASTOLIC BLOOD PRESSURE: 80 MMHG | SYSTOLIC BLOOD PRESSURE: 143 MMHG | OXYGEN SATURATION: 97 %

## 2024-05-27 VITALS — TEMPERATURE: 98.1 F | OXYGEN SATURATION: 96 % | DIASTOLIC BLOOD PRESSURE: 74 MMHG | SYSTOLIC BLOOD PRESSURE: 136 MMHG

## 2024-05-27 VITALS — SYSTOLIC BLOOD PRESSURE: 143 MMHG | DIASTOLIC BLOOD PRESSURE: 80 MMHG

## 2024-05-27 LAB
BASOPHILS # BLD AUTO: 0 K/UL (ref 0–0.2)
BASOPHILS NFR BLD AUTO: 0.4 % (ref 0–2)
BUN SERPL-MCNC: 9 MG/DL (ref 7–18)
CALCIUM SERPL-MCNC: 9.4 MG/DL (ref 8.5–10.1)
CHLORIDE SERPL-SCNC: 103 MMOL/L (ref 98–107)
CO2 SERPL-SCNC: 27 MMOL/L (ref 21–32)
CREAT SERPL-MCNC: 0.9 MG/DL (ref 0.6–1.3)
EOSINOPHIL # BLD AUTO: 0.2 K/UL (ref 0–0.7)
EOSINOPHIL NFR BLD AUTO: 3.4 % (ref 0–6)
ERYTHROCYTE [DISTWIDTH] IN BLOOD BY AUTOMATED COUNT: 14.1 % (ref 11.5–15)
GLUCOSE SERPL-MCNC: 114 MG/DL (ref 74–106)
HCT VFR BLD AUTO: 43 % (ref 39–51)
HGB BLD-MCNC: 14.5 G/DL (ref 13.5–17.5)
LYMPHOCYTES NFR BLD AUTO: 0.9 K/UL (ref 0.8–4.8)
LYMPHOCYTES NFR BLD AUTO: 19 % (ref 20–44)
MAGNESIUM SERPL-MCNC: 2.3 MG/DL (ref 1.8–2.4)
MCH RBC QN AUTO: 31 PG (ref 26–33)
MCHC RBC AUTO-ENTMCNC: 34 G/DL (ref 31–36)
MCV RBC AUTO: 91 FL (ref 80–96)
MONOCYTES NFR BLD AUTO: 0.5 K/UL (ref 0.1–1.3)
MONOCYTES NFR BLD AUTO: 11.8 % (ref 2–12)
NEUTROPHILS # BLD AUTO: 3 K/UL (ref 1.8–8.9)
NEUTROPHILS NFR BLD AUTO: 65.4 % (ref 43–81)
PHOSPHATE SERPL-MCNC: 2.4 MG/DL (ref 2.5–4.9)
PLATELET # BLD AUTO: 145 K/UL (ref 150–450)
POTASSIUM SERPL-SCNC: 3.9 MMOL/L (ref 3.5–5.1)
RBC # BLD AUTO: 4.72 MIL/UL (ref 4.5–6)
SODIUM SERPL-SCNC: 137 MMOL/L (ref 136–145)
WBC NRBC COR # BLD AUTO: 4.7 K/UL (ref 4.3–11)

## 2024-07-11 ENCOUNTER — OFFICE (OUTPATIENT)
Dept: URBAN - METROPOLITAN AREA CLINIC 57 | Facility: CLINIC | Age: 79
End: 2024-07-11

## 2024-07-11 VITALS
HEIGHT: 66 IN | DIASTOLIC BLOOD PRESSURE: 81 MMHG | TEMPERATURE: 98.2 F | HEART RATE: 75 BPM | SYSTOLIC BLOOD PRESSURE: 128 MMHG | WEIGHT: 170 LBS | RESPIRATION RATE: 16 BRPM

## 2024-07-11 DIAGNOSIS — C22.0 CANCER, HEPATOCELLULAR: ICD-10-CM

## 2024-07-11 DIAGNOSIS — K74.60 CIRRHOSIS OF LIVER DUE TO CHRONIC HEPATITIS C: ICD-10-CM

## 2024-07-11 DIAGNOSIS — I85.00 ESOPHAGEAL VARICES: ICD-10-CM

## 2024-07-11 PROCEDURE — G2211 COMPLEX E/M VISIT ADD ON: HCPCS | Performed by: INTERNAL MEDICINE

## 2024-07-11 PROCEDURE — 99214 OFFICE O/P EST MOD 30 MIN: CPT | Performed by: INTERNAL MEDICINE

## 2024-07-11 NOTE — SERVICEHPINOTES
The patient is under the care of Dr. Boss for cirrhosis and having a TACE September 3, 2019 for a hepatocellular carcinoma. He was seen in hepatology clinic on April 17 and a follow-up endoscopy was recommended at that visit. He had an MRI on March 20, 2024. This showed diffuse nodular liver contour consistent with known cirrhosis and treatment-related changes at the dome of the right lobe. The patient last had an endoscopy in January 2023 at which time grade 1 varices were noted. The patient was noted to have an esophageal erosion at his last endoscopy and was prescribed pantoprazole. However he only takes that intermittently. His most recent laboratory data from March 2024 show glucose 101 AST 26 ALT 17 alk phos 114 alpha-fetoprotein 3.0 and platelets 169

## 2024-07-23 ENCOUNTER — AMBULATORY SURGICAL CENTER (OUTPATIENT)
Dept: URBAN - METROPOLITAN AREA SURGERY 36 | Facility: SURGERY | Age: 79
End: 2024-07-23

## 2024-07-23 VITALS
DIASTOLIC BLOOD PRESSURE: 82 MMHG | OXYGEN SATURATION: 95 % | HEIGHT: 66 IN | RESPIRATION RATE: 14 BRPM | TEMPERATURE: 98 F | SYSTOLIC BLOOD PRESSURE: 138 MMHG | WEIGHT: 170 LBS | HEART RATE: 75 BPM

## 2024-07-23 DIAGNOSIS — I85.00 ESOPHAGEAL VARICES WITHOUT BLEEDING: ICD-10-CM

## 2024-07-23 PROCEDURE — 43235 EGD DIAGNOSTIC BRUSH WASH: CPT | Performed by: INTERNAL MEDICINE

## 2025-08-28 ENCOUNTER — OFFICE (OUTPATIENT)
Dept: URBAN - METROPOLITAN AREA CLINIC 57 | Facility: CLINIC | Age: 80
End: 2025-08-28

## 2025-08-28 VITALS
RESPIRATION RATE: 16 BRPM | DIASTOLIC BLOOD PRESSURE: 74 MMHG | HEART RATE: 83 BPM | TEMPERATURE: 98.6 F | SYSTOLIC BLOOD PRESSURE: 127 MMHG | HEIGHT: 66 IN | WEIGHT: 163 LBS

## 2025-08-28 DIAGNOSIS — D64.9 ANEMIA UNSPECIFIED: ICD-10-CM

## 2025-08-28 DIAGNOSIS — K21.9 GERD: ICD-10-CM

## 2025-08-28 DIAGNOSIS — C22.0 CANCER, HEPATOCELLULAR: ICD-10-CM

## 2025-08-28 DIAGNOSIS — K74.60 CIRRHOSIS OF LIVER DUE TO CHRONIC HEPATITIS C: ICD-10-CM

## 2025-08-28 PROCEDURE — 99214 OFFICE O/P EST MOD 30 MIN: CPT | Performed by: INTERNAL MEDICINE

## 2025-08-29 VITALS — HEIGHT: 66 IN

## 2025-09-02 ENCOUNTER — AMBULATORY SURGICAL CENTER (OUTPATIENT)
Dept: URBAN - METROPOLITAN AREA SURGERY 34 | Facility: SURGERY | Age: 80
End: 2025-09-02